# Patient Record
Sex: FEMALE | Race: WHITE | URBAN - METROPOLITAN AREA
[De-identification: names, ages, dates, MRNs, and addresses within clinical notes are randomized per-mention and may not be internally consistent; named-entity substitution may affect disease eponyms.]

---

## 2020-09-15 ENCOUNTER — INPATIENT (INPATIENT)
Facility: HOSPITAL | Age: 80
LOS: 5 days | Discharge: HOME HEALTH | End: 2020-09-21
Attending: INTERNAL MEDICINE | Admitting: INTERNAL MEDICINE
Payer: MEDICARE

## 2020-09-15 VITALS
DIASTOLIC BLOOD PRESSURE: 76 MMHG | TEMPERATURE: 98 F | HEART RATE: 78 BPM | RESPIRATION RATE: 18 BRPM | OXYGEN SATURATION: 98 % | SYSTOLIC BLOOD PRESSURE: 143 MMHG

## 2020-09-15 LAB
ALBUMIN SERPL ELPH-MCNC: 3.6 G/DL — SIGNIFICANT CHANGE UP (ref 3.5–5.2)
ALP SERPL-CCNC: 64 U/L — SIGNIFICANT CHANGE UP (ref 30–115)
ALT FLD-CCNC: 18 U/L — SIGNIFICANT CHANGE UP (ref 0–41)
ANION GAP SERPL CALC-SCNC: 10 MMOL/L — SIGNIFICANT CHANGE UP (ref 7–14)
APTT BLD: 27.5 SEC — SIGNIFICANT CHANGE UP (ref 27–39.2)
AST SERPL-CCNC: 24 U/L — SIGNIFICANT CHANGE UP (ref 0–41)
BASOPHILS # BLD AUTO: 0.06 K/UL — SIGNIFICANT CHANGE UP (ref 0–0.2)
BASOPHILS NFR BLD AUTO: 0.5 % — SIGNIFICANT CHANGE UP (ref 0–1)
BILIRUB SERPL-MCNC: 0.3 MG/DL — SIGNIFICANT CHANGE UP (ref 0.2–1.2)
BUN SERPL-MCNC: 17 MG/DL — SIGNIFICANT CHANGE UP (ref 10–20)
CALCIUM SERPL-MCNC: 8.9 MG/DL — SIGNIFICANT CHANGE UP (ref 8.5–10.1)
CHLORIDE SERPL-SCNC: 103 MMOL/L — SIGNIFICANT CHANGE UP (ref 98–110)
CO2 SERPL-SCNC: 24 MMOL/L — SIGNIFICANT CHANGE UP (ref 17–32)
CREAT SERPL-MCNC: 1.1 MG/DL — SIGNIFICANT CHANGE UP (ref 0.7–1.5)
EOSINOPHIL # BLD AUTO: 0.05 K/UL — SIGNIFICANT CHANGE UP (ref 0–0.7)
EOSINOPHIL NFR BLD AUTO: 0.4 % — SIGNIFICANT CHANGE UP (ref 0–8)
GLUCOSE SERPL-MCNC: 145 MG/DL — HIGH (ref 70–99)
HCT VFR BLD CALC: 29.8 % — LOW (ref 37–47)
HGB BLD-MCNC: 9.8 G/DL — LOW (ref 12–16)
IMM GRANULOCYTES NFR BLD AUTO: 0.4 % — HIGH (ref 0.1–0.3)
INR BLD: 0.99 RATIO — SIGNIFICANT CHANGE UP (ref 0.65–1.3)
LYMPHOCYTES # BLD AUTO: 17.6 % — LOW (ref 20.5–51.1)
LYMPHOCYTES # BLD AUTO: 2.11 K/UL — SIGNIFICANT CHANGE UP (ref 1.2–3.4)
MCHC RBC-ENTMCNC: 31.5 PG — HIGH (ref 27–31)
MCHC RBC-ENTMCNC: 32.9 G/DL — SIGNIFICANT CHANGE UP (ref 32–37)
MCV RBC AUTO: 95.8 FL — SIGNIFICANT CHANGE UP (ref 81–99)
MONOCYTES # BLD AUTO: 0.9 K/UL — HIGH (ref 0.1–0.6)
MONOCYTES NFR BLD AUTO: 7.5 % — SIGNIFICANT CHANGE UP (ref 1.7–9.3)
NEUTROPHILS # BLD AUTO: 8.79 K/UL — HIGH (ref 1.4–6.5)
NEUTROPHILS NFR BLD AUTO: 73.6 % — SIGNIFICANT CHANGE UP (ref 42.2–75.2)
NRBC # BLD: 0 /100 WBCS — SIGNIFICANT CHANGE UP (ref 0–0)
PLATELET # BLD AUTO: 208 K/UL — SIGNIFICANT CHANGE UP (ref 130–400)
POTASSIUM SERPL-MCNC: 4.1 MMOL/L — SIGNIFICANT CHANGE UP (ref 3.5–5)
POTASSIUM SERPL-SCNC: 4.1 MMOL/L — SIGNIFICANT CHANGE UP (ref 3.5–5)
PROT SERPL-MCNC: 5.8 G/DL — LOW (ref 6–8)
PROTHROM AB SERPL-ACNC: 11.4 SEC — SIGNIFICANT CHANGE UP (ref 9.95–12.87)
RBC # BLD: 3.11 M/UL — LOW (ref 4.2–5.4)
RBC # FLD: 13.2 % — SIGNIFICANT CHANGE UP (ref 11.5–14.5)
SODIUM SERPL-SCNC: 137 MMOL/L — SIGNIFICANT CHANGE UP (ref 135–146)
WBC # BLD: 11.96 K/UL — HIGH (ref 4.8–10.8)
WBC # FLD AUTO: 11.96 K/UL — HIGH (ref 4.8–10.8)

## 2020-09-15 PROCEDURE — 99285 EMERGENCY DEPT VISIT HI MDM: CPT

## 2020-09-15 PROCEDURE — 72125 CT NECK SPINE W/O DYE: CPT | Mod: 26

## 2020-09-15 PROCEDURE — 73502 X-RAY EXAM HIP UNI 2-3 VIEWS: CPT | Mod: 26,LT

## 2020-09-15 PROCEDURE — 72170 X-RAY EXAM OF PELVIS: CPT | Mod: 26,59

## 2020-09-15 PROCEDURE — 70450 CT HEAD/BRAIN W/O DYE: CPT | Mod: 26

## 2020-09-15 PROCEDURE — 93010 ELECTROCARDIOGRAM REPORT: CPT

## 2020-09-15 PROCEDURE — 71045 X-RAY EXAM CHEST 1 VIEW: CPT | Mod: 26

## 2020-09-15 RX ORDER — MORPHINE SULFATE 50 MG/1
4 CAPSULE, EXTENDED RELEASE ORAL ONCE
Refills: 0 | Status: DISCONTINUED | OUTPATIENT
Start: 2020-09-15 | End: 2020-09-15

## 2020-09-15 RX ORDER — METHOCARBAMOL 500 MG/1
1000 TABLET, FILM COATED ORAL ONCE
Refills: 0 | Status: COMPLETED | OUTPATIENT
Start: 2020-09-15 | End: 2020-09-15

## 2020-09-15 RX ADMIN — MORPHINE SULFATE 4 MILLIGRAM(S): 50 CAPSULE, EXTENDED RELEASE ORAL at 21:50

## 2020-09-15 RX ADMIN — MORPHINE SULFATE 4 MILLIGRAM(S): 50 CAPSULE, EXTENDED RELEASE ORAL at 22:20

## 2020-09-15 RX ADMIN — METHOCARBAMOL 1000 MILLIGRAM(S): 500 TABLET, FILM COATED ORAL at 23:49
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
## 2020-09-15 NOTE — ED ADULT NURSE NOTE - OBJECTIVE STATEMENT
patient complaining of left hip pain s/p fall at home. patient has sudden nerve pain on right hip that caused patient to fall on her left side. patient has numbness and tingling on her left leg but sensation intact. patient hit her head when she fell, denies LOC. no open wound on her head.
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
## 2020-09-15 NOTE — ED ADULT NURSE NOTE - NS ED PATIENT SAFETY CONCERN
Telephone Encounter by Elinor Aguilera at 10/11/18 03:11 PM     Author:  Elinor Aguilera Service:  (none) Author Type:  Patient      Filed:  10/11/18 03:12 PM Encounter Date:  10/11/2018 Status:  Signed     :  Elinor Aguilera (Patient )            Per orders from visit today, please place referral for patient to be seen for a nephrology consult[MM1.1M]       Revision History        User Key Date/Time User Provider Type Action    > MM1.1 10/11/18 03:12 PM Elinor Aguilera Patient  Sign    M - Manual             No

## 2020-09-15 NOTE — ED PROVIDER NOTE - CLINICAL SUMMARY MEDICAL DECISION MAKING FREE TEXT BOX
L hip pain s/p mechanical fall - pxr read as L hip intertroch bony island / no fx, remainder of imaging neg for acute injuries, sx described c/w poss sciatica?, low suspicion for occult fx, pt reassessed after analgasia and unable to ambulate in ED, admitted for inpt PT/Rehab consult & further mgmt

## 2020-09-15 NOTE — ED PROVIDER NOTE - PHYSICAL EXAMINATION
nad  skin warm, dry  ncat  neck supple  rrr nl s1s2 no mrg  ctab no wrr  abd soft ntnd no palpable masses no rgr  pelvis- stable, +ttp lat aspect of hip, no ecchymosis or deformity, rom intact but painful, nvid  back non-tender no cvat  ext see pelvis exam; no cce dpi  neuro aaox3 grossly nf exam

## 2020-09-16 LAB — SARS-COV-2 RNA SPEC QL NAA+PROBE: SIGNIFICANT CHANGE UP

## 2020-09-16 PROCEDURE — 99221 1ST HOSP IP/OBS SF/LOW 40: CPT | Mod: AI,GC

## 2020-09-16 PROCEDURE — 72149 MRI LUMBAR SPINE W/DYE: CPT | Mod: 26

## 2020-09-16 RX ORDER — SENNA PLUS 8.6 MG/1
2 TABLET ORAL ONCE
Refills: 0 | Status: DISCONTINUED | OUTPATIENT
Start: 2020-09-16 | End: 2020-09-21

## 2020-09-16 RX ORDER — TAMOXIFEN CITRATE 20 MG/1
0 TABLET, FILM COATED ORAL
Qty: 0 | Refills: 3 | DISCHARGE

## 2020-09-16 RX ORDER — TAMOXIFEN CITRATE 20 MG/1
20 TABLET, FILM COATED ORAL AT BEDTIME
Refills: 0 | Status: DISCONTINUED | OUTPATIENT
Start: 2020-09-16 | End: 2020-09-21

## 2020-09-16 RX ORDER — OXYCODONE AND ACETAMINOPHEN 5; 325 MG/1; MG/1
1 TABLET ORAL EVERY 6 HOURS
Refills: 0 | Status: ACTIVE | OUTPATIENT
Start: 2020-09-16 | End: 2020-09-23

## 2020-09-16 RX ORDER — ENOXAPARIN SODIUM 100 MG/ML
40 INJECTION SUBCUTANEOUS DAILY
Refills: 0 | Status: DISCONTINUED | OUTPATIENT
Start: 2020-09-16 | End: 2020-09-21

## 2020-09-16 RX ORDER — SENNA PLUS 8.6 MG/1
2 TABLET ORAL AT BEDTIME
Refills: 0 | Status: DISCONTINUED | OUTPATIENT
Start: 2020-09-17 | End: 2020-09-21

## 2020-09-16 RX ORDER — METHOCARBAMOL 500 MG/1
500 TABLET, FILM COATED ORAL THREE TIMES A DAY
Refills: 0 | Status: COMPLETED | OUTPATIENT
Start: 2020-09-16 | End: 2020-09-18

## 2020-09-16 RX ORDER — PANTOPRAZOLE SODIUM 20 MG/1
40 TABLET, DELAYED RELEASE ORAL
Refills: 0 | Status: DISCONTINUED | OUTPATIENT
Start: 2020-09-16 | End: 2020-09-21

## 2020-09-16 RX ORDER — ONDANSETRON 8 MG/1
4 TABLET, FILM COATED ORAL EVERY 6 HOURS
Refills: 0 | Status: DISCONTINUED | OUTPATIENT
Start: 2020-09-16 | End: 2020-09-21

## 2020-09-16 RX ADMIN — OXYCODONE AND ACETAMINOPHEN 1 TABLET(S): 5; 325 TABLET ORAL at 03:33

## 2020-09-16 RX ADMIN — METHOCARBAMOL 500 MILLIGRAM(S): 500 TABLET, FILM COATED ORAL at 05:50

## 2020-09-16 RX ADMIN — OXYCODONE AND ACETAMINOPHEN 1 TABLET(S): 5; 325 TABLET ORAL at 10:10

## 2020-09-16 RX ADMIN — OXYCODONE AND ACETAMINOPHEN 1 TABLET(S): 5; 325 TABLET ORAL at 09:41

## 2020-09-16 RX ADMIN — METHOCARBAMOL 500 MILLIGRAM(S): 500 TABLET, FILM COATED ORAL at 21:45

## 2020-09-16 RX ADMIN — ENOXAPARIN SODIUM 40 MILLIGRAM(S): 100 INJECTION SUBCUTANEOUS at 12:08

## 2020-09-16 RX ADMIN — ONDANSETRON 4 MILLIGRAM(S): 8 TABLET, FILM COATED ORAL at 09:41

## 2020-09-16 RX ADMIN — METHOCARBAMOL 500 MILLIGRAM(S): 500 TABLET, FILM COATED ORAL at 13:04

## 2020-09-16 RX ADMIN — PANTOPRAZOLE SODIUM 40 MILLIGRAM(S): 20 TABLET, DELAYED RELEASE ORAL at 06:20

## 2020-09-16 RX ADMIN — TAMOXIFEN CITRATE 20 MILLIGRAM(S): 20 TABLET, FILM COATED ORAL at 21:45

## 2020-09-16 RX ADMIN — OXYCODONE AND ACETAMINOPHEN 1 TABLET(S): 5; 325 TABLET ORAL at 04:03

## 2020-09-16 NOTE — PHYSICAL THERAPY INITIAL EVALUATION ADULT - GENERAL OBSERVATIONS, REHAB EVAL
Spoke with SARAH Ceja prior to entering room; as per RN, pt unlikely to participate in PT session at this time as she was c/o excruciating pain earlier despite pain medication. Pt also pending Lumbar MRI (No fx found on x ray). Spoke with pt who is adamantly declining b/s PT due to 10/10 L gluteal/proximal LLE pain and LBP. PT answered all pt questions regarding PT POC and content for when pt appropriate. Will await MRI results before beginning OOB activity with PT as pain hopefully comes down; pt verbalized full agreement. Spoke with MD who is okay with application of hot packs to L gluteal site (as per pt request). Pt educated to remove packs after 20 min or if it feels too hot, RN notified of hot packs. Pt verbalized understanding. Spoke with SARAH Ceja prior to entering room; as per RN, pt unlikely to participate in PT session at this time as she was c/o excruciating pain earlier despite pain medication. Pt also pending Lumbar MRI (No fx found on x ray). Spoke with pt who is adamantly declining b/s PT due to 10/10 L gluteal/proximal LLE pain and LBP. PT answered all pt questions regarding PT POC and content for when pt appropriate. Will await MRI results before beginning OOB activity with PT as pain hopefully comes down; pt verbalized full agreement. Spoke with MD ([id number]) who is okay with application of hot packs to L gluteal site (as per pt request). Pt educated to remove packs after 20 min or if it feels too hot, RN notified of hot packs. Pt verbalized understanding.
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
## 2020-09-16 NOTE — H&P ADULT - NSHPPHYSICALEXAM_GEN_ALL_CORE
CONSTITUTIONAL: No acute distress, well-developed, well-groomed, AAOx3  HEAD: Atraumatic, normocephalic  EYES: EOM intact, PERRLA, conjunctiva and sclera clear  ENT: Supple, no masses, no thyromegaly, no bruits, no JVD; moist mucous membranes  PULMONARY: Clear to auscultation bilaterally; no wheezes, rales, or rhonchi  CARDIOVASCULAR: Regular rate and rhythm; no murmurs, rubs, or gallops  GASTROINTESTINAL: Soft, non-tender, non-distended; bowel sounds present  MUSCULOSKELETAL: 2+ peripheral pulses;   NEUROLOGY: non-focal  SKIN: No rashes or lesions; warm and dry CONSTITUTIONAL: In acute distress due to pain   HEAD: Atraumatic, normocephalic  EYES: EOM intact, PERRLA, conjunctiva and sclera clear  ENT: Supple, no masses, no thyromegaly, no bruits, no JVD; moist mucous membranes  PULMONARY: Clear to auscultation bilaterally; no wheezes, rales, or rhonchi  CARDIOVASCULAR: Regular rate and rhythm; no murmurs, rubs, or gallops  GASTROINTESTINAL: Soft, non-tender, non-distended; bowel sounds present  MUSCULOSKELETAL: 2+ peripheral pulses. pain with movement of LLE  NEUROLOGY: non-focal  SKIN: No rashes or lesions; warm and dry

## 2020-09-16 NOTE — H&P ADULT - NSHPREVIEWOFSYSTEMS_GEN_ALL_CORE
CONSTITUTIONAL: No weakness, fevers or chills; No headaches  EYES: No visual changes, eye pain, or discharge  ENT: No vertigo; No ear pain or change in hearing; No sore throat or difficulty swallowing  NECK: No pain or stiffness  RESPIRATORY: No cough, wheezing, or hemoptysis; No shortness of breath  CARDIOVASCULAR: No chest pain or palpitations  GASTROINTESTINAL: No abdominal or epigastric pain; No nausea, vomiting, or hematemesis; No diarrhea or constipation; No melena or hematochezia  GENITOURINARY: No dysuria, frequency or hematuria  MUSCULOSKELETAL: No joint pain, no muscle pain, no weakness  NEUROLOGICAL: No numbness or weakness  SKIN: No itching or rashes CONSTITUTIONAL: No weakness, fevers or chills; No headaches  EYES: No visual changes, eye pain, or discharge  ENT: No vertigo; No ear pain or change in hearing; No sore throat or difficulty swallowing  NECK: No pain or stiffness  RESPIRATORY: No cough, wheezing, or hemoptysis; No shortness of breath  CARDIOVASCULAR: No chest pain or palpitations  GASTROINTESTINAL: No abdominal or epigastric pain; No nausea, vomiting, or hematemesis; No diarrhea or constipation; No melena or hematochezia  GENITOURINARY: No dysuria, frequency or hematuria  MUSCULOSKELETAL: Muscle and joint pain   NEUROLOGICAL: No numbness or weakness  SKIN: No itching or rashes

## 2020-09-16 NOTE — H&P ADULT - NSHPLABSRESULTS_GEN_ALL_CORE
VITAL SIGNS: Last 24 Hours  T(C): 36.7 (15 Sep 2020 23:20), Max: 36.8 (15 Sep 2020 20:50)  T(F): 98 (15 Sep 2020 23:20), Max: 98.2 (15 Sep 2020 20:50)  HR: 73 (15 Sep 2020 23:20) (73 - 78)  BP: 126/59 (15 Sep 2020 23:20) (126/59 - 143/76)  BP(mean): --  RR: 18 (15 Sep 2020 23:20) (18 - 18)  SpO2: 98% (15 Sep 2020 23:20) (98% - 98%)    LABS:                        9.8    11.96 )-----------( 208      ( 15 Sep 2020 21:40 )             29.8     09-15    137  |  103  |  17  ----------------------------<  145<H>  4.1   |  24  |  1.1    Ca    8.9      15 Sep 2020 21:40    TPro  5.8<L>  /  Alb  3.6  /  TBili  0.3  /  DBili  x   /  AST  24  /  ALT  18  /  AlkPhos  64  09-15    PT/INR - ( 15 Sep 2020 21:40 )   PT: 11.40 sec;   INR: 0.99 ratio         PTT - ( 15 Sep 2020 21:40 )  PTT:27.5 sec      RADIOLOGY:    < from: CT Head No Cont (09.15.20 @ 22:20) >  Impression:  No acute intracranial hemorrhage, mass-effect or midline shift.  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  < end of copied text >    < from: Xray Hip 2-3 Views, Left (09.15.20 @ 21:52) >  There are mild degenerative changesof the left hip. No definite fracture is seen. Apparent bone island is projecting over the left intertrochanteric region and measures approximately 7 mm.  < end of copied text >    < from: Xray Pelvis AP only (09.15.20 @ 21:51) >  There are mild degenerative changes of both hips. No definite fracture is seen.  < end of copied text > VITAL SIGNS: Last 24 Hours  T(C): 36.7 (15 Sep 2020 23:20), Max: 36.8 (15 Sep 2020 20:50)  T(F): 98 (15 Sep 2020 23:20), Max: 98.2 (15 Sep 2020 20:50)  HR: 73 (15 Sep 2020 23:20) (73 - 78)  BP: 126/59 (15 Sep 2020 23:20) (126/59 - 143/76)  BP(mean): --  RR: 18 (15 Sep 2020 23:20) (18 - 18)  SpO2: 98% (15 Sep 2020 23:20) (98% - 98%)    LABS:                        9.8    11.96 )-----------( 208      ( 15 Sep 2020 21:40 )             29.8     09-15    137  |  103  |  17  ----------------------------<  145<H>  4.1   |  24  |  1.1    Ca    8.9      15 Sep 2020 21:40    TPro  5.8<L>  /  Alb  3.6  /  TBili  0.3  /  DBili  x   /  AST  24  /  ALT  18  /  AlkPhos  64  09-15    PT/INR - ( 15 Sep 2020 21:40 )   PT: 11.40 sec;   INR: 0.99 ratio         PTT - ( 15 Sep 2020 21:40 )  PTT:27.5 sec      RADIOLOGY:    < from: CT Head No Cont (09.15.20 @ 22:20) >  Impression:  No acute intracranial hemorrhage, mass-effect or midline shift.  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  < end of copied text >    < from: Xray Hip 2-3 Views, Left (09.15.20 @ 21:52) >  There are mild degenerative changes of the left hip. No definite fracture is seen. Apparent bone island is projecting over the left intertrochanteric region and measures approximately 7 mm.  < end of copied text >    < from: Xray Pelvis AP only (09.15.20 @ 21:51) >  There are mild degenerative changes of both hips. No definite fracture is seen.  < end of copied text >

## 2020-09-17 LAB
ABO RH CONFIRMATION: SIGNIFICANT CHANGE UP
BASOPHILS # BLD AUTO: 0.05 K/UL — SIGNIFICANT CHANGE UP (ref 0–0.2)
BASOPHILS NFR BLD AUTO: 0.4 % — SIGNIFICANT CHANGE UP (ref 0–1)
CK SERPL-CCNC: 72 U/L — SIGNIFICANT CHANGE UP (ref 0–225)
EOSINOPHIL # BLD AUTO: 0.06 K/UL — SIGNIFICANT CHANGE UP (ref 0–0.7)
EOSINOPHIL NFR BLD AUTO: 0.5 % — SIGNIFICANT CHANGE UP (ref 0–8)
GLUCOSE BLDC GLUCOMTR-MCNC: 137 MG/DL — HIGH (ref 70–99)
GLUCOSE BLDC GLUCOMTR-MCNC: 190 MG/DL — HIGH (ref 70–99)
HCT VFR BLD CALC: 21.4 % — LOW (ref 37–47)
HCT VFR BLD CALC: 27.5 % — LOW (ref 37–47)
HGB BLD-MCNC: 7.1 G/DL — LOW (ref 12–16)
HGB BLD-MCNC: 9.2 G/DL — LOW (ref 12–16)
IMM GRANULOCYTES NFR BLD AUTO: 0.6 % — HIGH (ref 0.1–0.3)
IRON SATN MFR SERPL: 19 % — SIGNIFICANT CHANGE UP (ref 15–50)
IRON SATN MFR SERPL: 37 UG/DL — SIGNIFICANT CHANGE UP (ref 35–150)
LYMPHOCYTES # BLD AUTO: 2.53 K/UL — SIGNIFICANT CHANGE UP (ref 1.2–3.4)
LYMPHOCYTES # BLD AUTO: 20.9 % — SIGNIFICANT CHANGE UP (ref 20.5–51.1)
MCHC RBC-ENTMCNC: 30.5 PG — SIGNIFICANT CHANGE UP (ref 27–31)
MCHC RBC-ENTMCNC: 31.1 PG — HIGH (ref 27–31)
MCHC RBC-ENTMCNC: 33.2 G/DL — SIGNIFICANT CHANGE UP (ref 32–37)
MCHC RBC-ENTMCNC: 33.5 G/DL — SIGNIFICANT CHANGE UP (ref 32–37)
MCV RBC AUTO: 91.1 FL — SIGNIFICANT CHANGE UP (ref 81–99)
MCV RBC AUTO: 93.9 FL — SIGNIFICANT CHANGE UP (ref 81–99)
MONOCYTES # BLD AUTO: 1.43 K/UL — HIGH (ref 0.1–0.6)
MONOCYTES NFR BLD AUTO: 11.8 % — HIGH (ref 1.7–9.3)
NEUTROPHILS # BLD AUTO: 7.97 K/UL — HIGH (ref 1.4–6.5)
NEUTROPHILS NFR BLD AUTO: 65.8 % — SIGNIFICANT CHANGE UP (ref 42.2–75.2)
NRBC # BLD: 0 /100 WBCS — SIGNIFICANT CHANGE UP (ref 0–0)
NRBC # BLD: 0 /100 WBCS — SIGNIFICANT CHANGE UP (ref 0–0)
PLATELET # BLD AUTO: 147 K/UL — SIGNIFICANT CHANGE UP (ref 130–400)
PLATELET # BLD AUTO: 161 K/UL — SIGNIFICANT CHANGE UP (ref 130–400)
RBC # BLD: 2.28 M/UL — LOW (ref 4.2–5.4)
RBC # BLD: 3.02 M/UL — LOW (ref 4.2–5.4)
RBC # FLD: 13.3 % — SIGNIFICANT CHANGE UP (ref 11.5–14.5)
RBC # FLD: 14.4 % — SIGNIFICANT CHANGE UP (ref 11.5–14.5)
TIBC SERPL-MCNC: 192 UG/DL — LOW (ref 220–430)
TRANSFERRIN SERPL-MCNC: 164 MG/DL — LOW (ref 200–360)
UIBC SERPL-MCNC: 155 UG/DL — SIGNIFICANT CHANGE UP (ref 110–370)
WBC # BLD: 11.29 K/UL — HIGH (ref 4.8–10.8)
WBC # BLD: 12.11 K/UL — HIGH (ref 4.8–10.8)
WBC # FLD AUTO: 11.29 K/UL — HIGH (ref 4.8–10.8)
WBC # FLD AUTO: 12.11 K/UL — HIGH (ref 4.8–10.8)

## 2020-09-17 PROCEDURE — 99233 SBSQ HOSP IP/OBS HIGH 50: CPT

## 2020-09-17 RX ORDER — TRAMADOL HYDROCHLORIDE 50 MG/1
50 TABLET ORAL ONCE
Refills: 0 | Status: DISCONTINUED | OUTPATIENT
Start: 2020-09-17 | End: 2020-09-17

## 2020-09-17 RX ORDER — ACETAMINOPHEN 500 MG
650 TABLET ORAL EVERY 6 HOURS
Refills: 0 | Status: DISCONTINUED | OUTPATIENT
Start: 2020-09-17 | End: 2020-09-21

## 2020-09-17 RX ORDER — DEXAMETHASONE 0.5 MG/5ML
4 ELIXIR ORAL EVERY 8 HOURS
Refills: 0 | Status: DISCONTINUED | OUTPATIENT
Start: 2020-09-17 | End: 2020-09-18

## 2020-09-17 RX ORDER — GABAPENTIN 400 MG/1
100 CAPSULE ORAL AT BEDTIME
Refills: 0 | Status: DISCONTINUED | OUTPATIENT
Start: 2020-09-17 | End: 2020-09-18

## 2020-09-17 RX ORDER — CHLORHEXIDINE GLUCONATE 213 G/1000ML
1 SOLUTION TOPICAL
Refills: 0 | Status: DISCONTINUED | OUTPATIENT
Start: 2020-09-17 | End: 2020-09-21

## 2020-09-17 RX ADMIN — PANTOPRAZOLE SODIUM 40 MILLIGRAM(S): 20 TABLET, DELAYED RELEASE ORAL at 05:38

## 2020-09-17 RX ADMIN — TRAMADOL HYDROCHLORIDE 50 MILLIGRAM(S): 50 TABLET ORAL at 01:30

## 2020-09-17 RX ADMIN — METHOCARBAMOL 500 MILLIGRAM(S): 500 TABLET, FILM COATED ORAL at 21:44

## 2020-09-17 RX ADMIN — Medication 4 MILLIGRAM(S): at 21:42

## 2020-09-17 RX ADMIN — TAMOXIFEN CITRATE 20 MILLIGRAM(S): 20 TABLET, FILM COATED ORAL at 21:43

## 2020-09-17 RX ADMIN — CHLORHEXIDINE GLUCONATE 1 APPLICATION(S): 213 SOLUTION TOPICAL at 21:39

## 2020-09-17 RX ADMIN — GABAPENTIN 100 MILLIGRAM(S): 400 CAPSULE ORAL at 21:43

## 2020-09-17 RX ADMIN — Medication 4 MILLIGRAM(S): at 16:14

## 2020-09-17 RX ADMIN — METHOCARBAMOL 500 MILLIGRAM(S): 500 TABLET, FILM COATED ORAL at 13:23

## 2020-09-17 RX ADMIN — ENOXAPARIN SODIUM 40 MILLIGRAM(S): 100 INJECTION SUBCUTANEOUS at 12:01

## 2020-09-17 RX ADMIN — SENNA PLUS 2 TABLET(S): 8.6 TABLET ORAL at 21:43

## 2020-09-17 RX ADMIN — TRAMADOL HYDROCHLORIDE 50 MILLIGRAM(S): 50 TABLET ORAL at 00:58

## 2020-09-17 RX ADMIN — METHOCARBAMOL 500 MILLIGRAM(S): 500 TABLET, FILM COATED ORAL at 05:38

## 2020-09-17 NOTE — PHYSICAL THERAPY INITIAL EVALUATION ADULT - SPECIFY REASON(S)
915 am Will hold PT at this time due to decreased Hgb level (6.8). PT to follow up.
Statement Selected

## 2020-09-17 NOTE — PHYSICAL THERAPY INITIAL EVALUATION ADULT - PASSIVE RANGE OF MOTION EXAMINATION, REHAB EVAL
deficits as listed below/LLE limited due to 10/10 radiating pain from left hip and L/S(hip flexion ~90 degrees in supine)

## 2020-09-17 NOTE — PHYSICAL THERAPY INITIAL EVALUATION ADULT - LEVEL OF INDEPENDENCE: GAIT, REHAB EVAL
n/a d/t pt becoming dizzy while attempting static standing at bedside. Pt was immediately put back in bed and BP taken (115/56 mmHg, HR = 82bpm)

## 2020-09-17 NOTE — PROGRESS NOTE ADULT - ASSESSMENT
# s/p mechanical fall at home with inability to ambulate  - pelvic and hips x ray - no acute fractures  - s/p MR L-Spine:  from: MR Lumbar Spine w/ IV Cont (09.16.20 @ 22:07) >    Multilevel mild to moderate lumbar spondylosis, most significant at L3-L4 with moderate spinal canal stenosis and mild bilateral foraminal narrowing.    No abnormal enhancement.  - start on Neurontin 100 mg po once daily at bedtime  -start on Decadron trial tx. 4mg po every 8 hours - to help decrease local inflammatory process post fall   -Tylenol/tramadol prn for pain management, pt. is not tolerating percocet( vomiting post intake)  -Robaxin tx.  -PT eval to assess ambulation  -Neurosurgical eval    # Macrocytic anemia- obtain anemia work up  - no gross bleeding events  - f/up stool hemoccult study  -transfuse 1 unit PRBC today  -next CBC 8pm tonight    # h/o breast ca- on tamoxifen tx.    # Constipated- on laxative tx.    daily DVT prophylaxis    #Progress Note Handoff: optimize pain control, PT eval, Neurosurgical eval    Family discussion: medical plan of tx. was d/w pt. by bedside   Disposition: Home__once medically stable
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
## 2020-09-17 NOTE — PHYSICAL THERAPY INITIAL EVALUATION ADULT - MANUAL MUSCLE TESTING RESULTS, REHAB EVAL
RLE 3+/5; LLE not tested due to radiating 10/10 pain in LLE from left hip and L/S/grossly assessed due to
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
## 2020-09-17 NOTE — PROGRESS NOTE ADULT - SUBJECTIVE AND OBJECTIVE BOX
TONI MORRIS  [facility] T[facility] 0[address] ([facility])            Patient was evaluated and examined  by bedside, c/o severe legs pain and hips pain post fall, unable to ambulate due to legs pain, patient was crying during physical exam.                REVIEW OF SYSTEMS:  please see pertinent positives mentioned above, all other 12 ROS negative      T(C): , Max: 37.1 (09-17-20 @ 05:37)  HR: 86 (09-17-20 @ 13:02)  BP: 97/52 (09-17-20 @ 13:02)  RR: 18 (09-17-20 @ 13:02)  SpO2: --  CAPILLARY BLOOD GLUCOSE          PHYSICAL EXAM:  General: NAD, AAOX3, patient is laying comfortably in bed  HEENT: AT, NC, Supple, NO JVD, NO CB  Lungs: CTA B/L, no wheezing, no rhonchi  CVS: normal S1, S2, RRR, NO M/G/R  Abdomen: soft, bowel sounds present, non-tender, non-distended  Extremities: no edema, no clubbing, no cyanosis, positive peripheral pulses b/l  Neuro: no acute focal neurological deficits, hyperesthesia of b/l lower extremities, gait not  tested  Skin: no rash, no ecchymosis      LAB  CBC  Date: 09-17-20 @ 11:47  Mean cell Fpomlyxixr70.3  Mean cell Hemoglobin Conc33.0  Mean cell Volum 94.8  Platelet count-Automate 183  RBC Count 2.33  Red Cell Distrib Width13.3  WBC Count12.35  % Albumin, Urine--  Hematocrit 22.1  Hemoglobin 7.3  CBC  Date: 09-17-20 @ 10:50  Mean cell Kkigunvwwb43.1  Mean cell Hemoglobin Conc33.2  Mean cell Volum 93.9  Platelet count-Automate 161  RBC Count 2.28  Red Cell Distrib Width13.3  WBC Count12.11  % Albumin, Urine--  Hematocrit 21.4  Hemoglobin 7.1  CBC  Date: 09-17-20 @ 06:10  Mean cell Hrtsrsvdxe24.6  Mean cell Hemoglobin Conc33.0  Mean cell Volum 95.8  Platelet count-Automate 150  RBC Count 2.15  Red Cell Distrib Width13.3  WBC Count10.60  % Albumin, Urine--  Hematocrit 20.6  Hemoglobin 6.8  CBC  Date: 09-15-20 @ 21:40  Mean cell Zjtnmmyhhz47.5  Mean cell Hemoglobin Conc32.9  Mean cell Volum 95.8  Platelet count-Automate 208  RBC Count 3.11  Red Cell Distrib Width13.2  WBC Count11.96  % Albumin, Urine--  Hematocrit 29.8  Hemoglobin 9.8    [facility]  09-17-20 @ 06:10  Blood Gas Arterial-Calcium,Ionized--  Blood Urea Nitrogen, Serum 9 mg/dL<L> [10 - 20]  Carbon Dioxide, Serum24 mmol/L [17 - 32]  Chloride, Xygng430 mmol/L [98 - 110]  Creatinie, Serum1.0 mg/dL [0.7 - 1.5]  Glucose, Ectzf154 mg/dL<H> [70 - 99]  Potassium, Serum4.1 mmol/L [3.5 - 5.0]  Sodium, Serum 137 mmol/L [135 - 146]  [facility]  09-15-20 @ 21:40  Blood Gas Arterial-Calcium,Ionized--  Blood Urea Nitrogen, Serum 17 mg/dL [10 - 20]  Carbon Dioxide, Serum24 mmol/L [17 - 32]  Chloride, Xnkvb937 mmol/L [98 - 110]  Creatinie, Serum1.1 mg/dL [0.7 - 1.5]  Glucose, Tbqvk408 mg/dL<H> [70 - 99]  Potassium, Serum4.1 mmol/L [3.5 - 5.0]  Sodium, Serum 137 mmol/L [135 - 146]        PT/INR - ( 15 Sep 2020 21:40 )   PT: 11.40 sec;   INR: 0.99 ratio         PTT - ( 15 Sep 2020 21:40 )  PTT:27.5 sec        Medications:  acetaminophen   Tablet .. 650 milliGRAM(s) Oral every 6 hours PRN  chlorhexidine 4% Liquid 1 Application(s) Topical <User Schedule>  dexAMETHasone     Tablet 4 milliGRAM(s) Oral every 8 hours  enoxaparin Injectable 40 milliGRAM(s) SubCutaneous daily  gabapentin 100 milliGRAM(s) Oral at bedtime  methocarbamol 500 milliGRAM(s) Oral three times a day  ondansetron Injectable 4 milliGRAM(s) IV Push every 6 hours PRN  pantoprazole    Tablet 40 milliGRAM(s) Oral before breakfast  senna 2 Tablet(s) Oral once PRN  senna 2 Tablet(s) Oral at bedtime  tamoxifen 20 milliGRAM(s) Oral at bedtime  traMADol 50 milliGRAM(s) Oral every 8 hours PRN        Assessment and Plan:  # s/p mechanical fall at home with inability to ambulate  - pelvic and hips x ray - no acute fractures  - s/p MR L-Spine:  from: MR Lumbar Spine w/ IV Cont (09.16.20 @ 22:07) >    Multilevel mild to moderate lumbar spondylosis, most significant at L3-L4 with moderate spinal canal stenosis and mild bilateral foraminal narrowing.    No abnormal enhancement.    - start on Neurontin 100 mg po once daily at bedtime  -start on Decadron trial tx. 4mg po every 8 hours - to held decrease local inflammatory process post fall   -Tylenol/tramadol prn for pain management, pt. is not tolerating percocet( vomiting post intake)  -Robaxin tx.  -PT eval to assess ambulation  -Neurosurgical eval    # Macrocytic anemia- obtain anemia work up  - no gross bleeding events  - f/up stool hemeoccult study  -transfuse 1 unit PRBC today  -next CBC 6 pm tonight    # h/o breast ca- on tamoxifen tx.    # Constipated- on laxative tx.    daily DVT proph.     #Progress Note Handoff: optimize pain control, PT eval, Neurosurgical eval  Family discussion: medical plan of tx. was d/w pt. by bedside Disposition: Home__once medically stable

## 2020-09-17 NOTE — PROGRESS NOTE ADULT - SUBJECTIVE AND OBJECTIVE BOX
TONI MORRIS 79y Female  MRN#: [id number]   CODE STATUS:________      SUBJECTIVE  Patient is a 79y old Female who presents with a chief complaint of Mechanical Fall (17 Sep 2020 16:37)  Currently admitted to medicine with the primary diagnosis of Ambulatory dysfunction      Today is hospital day 1d.  Since yesterday patient underwent MRI of the LS to evaluate the left leg pain complaint. Patient stated to me this morning that she is expressing burning pain which travels down her lower back, down the buttocks, across the thigh and down to her toes. She notes to me that she has a history of back pain and disc herniation. Patient was laying down in the bed very concerned and in discomfort.           OBJECTIVE  PAST MEDICAL & SURGICAL HISTORY  Breast cancer      ALLERGIES:  penicillin (Anaphylaxis)    MEDICATIONS:  STANDING MEDICATIONS  bisacodyl 5 milliGRAM(s) Oral at bedtime  chlorhexidine 4% Liquid 1 Application(s) Topical <User Schedule>  dexAMETHasone     Tablet 4 milliGRAM(s) Oral every 8 hours  enoxaparin Injectable 40 milliGRAM(s) SubCutaneous daily  gabapentin 100 milliGRAM(s) Oral at bedtime  methocarbamol 500 milliGRAM(s) Oral three times a day  pantoprazole    Tablet 40 milliGRAM(s) Oral before breakfast  senna 2 Tablet(s) Oral at bedtime  tamoxifen 20 milliGRAM(s) Oral at bedtime    PRN MEDICATIONS  acetaminophen   Tablet .. 650 milliGRAM(s) Oral every 6 hours PRN  ondansetron Injectable 4 milliGRAM(s) IV Push every 6 hours PRN  senna 2 Tablet(s) Oral once PRN  traMADol 50 milliGRAM(s) Oral every 8 hours PRN      VITAL SIGNS: Last 24 Hours  T(C): 37.1 (17 Sep 2020 13:02), Max: 37.1 (17 Sep 2020 05:37)  T(F): 98.7 (17 Sep 2020 13:02), Max: 98.8 (17 Sep 2020 05:37)  HR: 86 (17 Sep 2020 13:02) (86 - 88)  BP: 97/52 (17 Sep 2020 13:02) (97/52 - 109/58)  BP(mean): --  RR: 18 (17 Sep 2020 13:02) (18 - 18)  SpO2: --    LABS:                        7.3    12.35 )-----------( 183      ( 17 Sep 2020 11:47 )             22.1     09-17    137  |  105  |  9<L>  ----------------------------<  106<H>  4.1   |  24  |  1.0    Ca    8.2<L>      17 Sep 2020 06:10    TPro  4.9<L>  /  Alb  3.2<L>  /  TBili  0.4  /  DBili  x   /  AST  16  /  ALT  12  /  AlkPhos  37  09-17    PT/INR - ( 15 Sep 2020 21:40 )   PT: 11.40 sec;   INR: 0.99 ratio         PTT - ( 15 Sep 2020 21:40 )  PTT:27.5 sec      Creatine Kinase, Serum: 72 U/L (09-17-20 @ 11:47)      CARDIAC MARKERS ( 17 Sep 2020 11:47 )  x     / x     / 72 U/L / x     / x          RADIOLOGY:  A< from: MR Lumbar Spine w/ IV Cont (09.16.20 @ 22:07) >  At L1-L2, there is disc bulge with ligamentum flavum and facet hypertrophy resulting in mild spinal canal stenosis and mild bilateral foraminal narrowing.    At L2-L3, there is disc bulge with ligamentum flavum and facet hypertrophy resulting in mild spinal canal stenosis and moderate right foraminal narrowing. Left neural foramen is patent    At L3-L4, there is disc bulge with ligamentum flavum and facet hypertrophy resulting in moderate spinal canal stenosis and mild bilateral foraminal narrowing.    At L4-L5, there is disc bulge with ligamentum flavum thickening and bilateral facet hypertrophy resulting in mild spinal canal stenosis. There is moderate right neuroforaminal narrowing.    At L5-S1, there is no significant disc herniation, spinal canal or neural foraminal stenosis.      IMPRESSION:    Multilevel mild to moderate lumbar spondylosis, most significant at L3-L4 with moderate spinal canal stenosis and mild bilateral foraminal narrowing.    No abnormal enhancement.    < end of copied text >      PHYSICAL EXAM:    GENERAL: NAD, well-developed, AAOx3  HEENT:  Atraumatic, Normocephalic. EOMI, PERRLA, conjunctiva and sclera clear, No JVD  PULMONARY: Clear to auscultation bilaterally; No wheeze  CARDIOVASCULAR: Regular rate and rhythm; No murmurs, rubs, or gallops  GASTROINTESTINAL: Soft, Nontender, Nondistended; Bowel sounds present  MUSCULOSKELETAL:  2+ Peripheral Pulses, No clubbing, cyanosis, or edema  NEUROLOGY: non-focal, sensation intact. (Could not perform further exam, patient was bedridden with a very low Hgb)  SKIN: No rashes or lesions

## 2020-09-17 NOTE — PHYSICAL THERAPY INITIAL EVALUATION ADULT - WEIGHT-BEARING RESTRICTIONS: SIT/STAND, REHAB EVAL
full weight-bearing/pt was unable to stand on the first try d/t pain in LLE; after being instructed to place all weight through her right leg and UE pt was able to stand on second try for 1 minute before c/o dizziness
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
## 2020-09-18 ENCOUNTER — TRANSCRIPTION ENCOUNTER (OUTPATIENT)
Age: 80
End: 2020-09-18

## 2020-09-18 LAB
ALBUMIN SERPL ELPH-MCNC: 3.6 G/DL — SIGNIFICANT CHANGE UP (ref 3.5–5.2)
ALP SERPL-CCNC: 47 U/L — SIGNIFICANT CHANGE UP (ref 30–115)
ALT FLD-CCNC: 12 U/L — SIGNIFICANT CHANGE UP (ref 0–41)
ANION GAP SERPL CALC-SCNC: 9 MMOL/L — SIGNIFICANT CHANGE UP (ref 7–14)
AST SERPL-CCNC: 18 U/L — SIGNIFICANT CHANGE UP (ref 0–41)
BASOPHILS # BLD AUTO: 0.03 K/UL — SIGNIFICANT CHANGE UP (ref 0–0.2)
BASOPHILS NFR BLD AUTO: 0.2 % — SIGNIFICANT CHANGE UP (ref 0–1)
BILIRUB SERPL-MCNC: 0.7 MG/DL — SIGNIFICANT CHANGE UP (ref 0.2–1.2)
BUN SERPL-MCNC: 10 MG/DL — SIGNIFICANT CHANGE UP (ref 10–20)
CALCIUM SERPL-MCNC: 9 MG/DL — SIGNIFICANT CHANGE UP (ref 8.5–10.1)
CHLORIDE SERPL-SCNC: 105 MMOL/L — SIGNIFICANT CHANGE UP (ref 98–110)
CO2 SERPL-SCNC: 25 MMOL/L — SIGNIFICANT CHANGE UP (ref 17–32)
CREAT SERPL-MCNC: 0.9 MG/DL — SIGNIFICANT CHANGE UP (ref 0.7–1.5)
EOSINOPHIL # BLD AUTO: 0 K/UL — SIGNIFICANT CHANGE UP (ref 0–0.7)
EOSINOPHIL NFR BLD AUTO: 0 % — SIGNIFICANT CHANGE UP (ref 0–8)
FERRITIN SERPL-MCNC: 73 NG/ML — SIGNIFICANT CHANGE UP (ref 15–150)
FOLATE SERPL-MCNC: 15.5 NG/ML — SIGNIFICANT CHANGE UP
GLUCOSE SERPL-MCNC: 134 MG/DL — HIGH (ref 70–99)
HCT VFR BLD CALC: 27.7 % — LOW (ref 37–47)
HGB BLD-MCNC: 9.3 G/DL — LOW (ref 12–16)
IMM GRANULOCYTES NFR BLD AUTO: 1.3 % — HIGH (ref 0.1–0.3)
LYMPHOCYTES # BLD AUTO: 1.64 K/UL — SIGNIFICANT CHANGE UP (ref 1.2–3.4)
LYMPHOCYTES # BLD AUTO: 12.2 % — LOW (ref 20.5–51.1)
MCHC RBC-ENTMCNC: 30.6 PG — SIGNIFICANT CHANGE UP (ref 27–31)
MCHC RBC-ENTMCNC: 33.6 G/DL — SIGNIFICANT CHANGE UP (ref 32–37)
MCV RBC AUTO: 91.1 FL — SIGNIFICANT CHANGE UP (ref 81–99)
MONOCYTES # BLD AUTO: 1.04 K/UL — HIGH (ref 0.1–0.6)
MONOCYTES NFR BLD AUTO: 7.7 % — SIGNIFICANT CHANGE UP (ref 1.7–9.3)
NEUTROPHILS # BLD AUTO: 10.59 K/UL — HIGH (ref 1.4–6.5)
NEUTROPHILS NFR BLD AUTO: 78.6 % — HIGH (ref 42.2–75.2)
NRBC # BLD: 0 /100 WBCS — SIGNIFICANT CHANGE UP (ref 0–0)
PLATELET # BLD AUTO: 172 K/UL — SIGNIFICANT CHANGE UP (ref 130–400)
POTASSIUM SERPL-MCNC: 4.8 MMOL/L — SIGNIFICANT CHANGE UP (ref 3.5–5)
POTASSIUM SERPL-SCNC: 4.8 MMOL/L — SIGNIFICANT CHANGE UP (ref 3.5–5)
PROT SERPL-MCNC: 5.8 G/DL — LOW (ref 6–8)
RBC # BLD: 3.04 M/UL — LOW (ref 4.2–5.4)
RBC # FLD: 15.1 % — HIGH (ref 11.5–14.5)
SODIUM SERPL-SCNC: 139 MMOL/L — SIGNIFICANT CHANGE UP (ref 135–146)
VIT B12 SERPL-MCNC: 235 PG/ML — SIGNIFICANT CHANGE UP (ref 232–1245)
WBC # BLD: 13.48 K/UL — HIGH (ref 4.8–10.8)
WBC # FLD AUTO: 13.48 K/UL — HIGH (ref 4.8–10.8)

## 2020-09-18 PROCEDURE — 99233 SBSQ HOSP IP/OBS HIGH 50: CPT

## 2020-09-18 RX ORDER — GABAPENTIN 400 MG/1
300 CAPSULE ORAL
Refills: 0 | Status: DISCONTINUED | OUTPATIENT
Start: 2020-09-18 | End: 2020-09-18

## 2020-09-18 RX ORDER — DEXAMETHASONE 0.5 MG/5ML
4 ELIXIR ORAL EVERY 12 HOURS
Refills: 0 | Status: DISCONTINUED | OUTPATIENT
Start: 2020-09-18 | End: 2020-09-19

## 2020-09-18 RX ORDER — GABAPENTIN 400 MG/1
100 CAPSULE ORAL
Refills: 0 | Status: DISCONTINUED | OUTPATIENT
Start: 2020-09-18 | End: 2020-09-21

## 2020-09-18 RX ORDER — LIDOCAINE 4 G/100G
1 CREAM TOPICAL EVERY 24 HOURS
Refills: 0 | Status: DISCONTINUED | OUTPATIENT
Start: 2020-09-18 | End: 2020-09-18

## 2020-09-18 RX ADMIN — Medication 10 MILLIGRAM(S): at 13:09

## 2020-09-18 RX ADMIN — METHOCARBAMOL 500 MILLIGRAM(S): 500 TABLET, FILM COATED ORAL at 22:22

## 2020-09-18 RX ADMIN — GABAPENTIN 100 MILLIGRAM(S): 400 CAPSULE ORAL at 17:26

## 2020-09-18 RX ADMIN — SENNA PLUS 2 TABLET(S): 8.6 TABLET ORAL at 22:22

## 2020-09-18 RX ADMIN — TAMOXIFEN CITRATE 20 MILLIGRAM(S): 20 TABLET, FILM COATED ORAL at 22:22

## 2020-09-18 RX ADMIN — Medication 4 MILLIGRAM(S): at 05:47

## 2020-09-18 RX ADMIN — METHOCARBAMOL 500 MILLIGRAM(S): 500 TABLET, FILM COATED ORAL at 13:09

## 2020-09-18 RX ADMIN — ENOXAPARIN SODIUM 40 MILLIGRAM(S): 100 INJECTION SUBCUTANEOUS at 11:38

## 2020-09-18 RX ADMIN — PANTOPRAZOLE SODIUM 40 MILLIGRAM(S): 20 TABLET, DELAYED RELEASE ORAL at 05:48

## 2020-09-18 RX ADMIN — Medication 5 MILLIGRAM(S): at 22:22

## 2020-09-18 RX ADMIN — Medication 4 MILLIGRAM(S): at 17:26

## 2020-09-18 RX ADMIN — METHOCARBAMOL 500 MILLIGRAM(S): 500 TABLET, FILM COATED ORAL at 05:47

## 2020-09-18 RX ADMIN — CHLORHEXIDINE GLUCONATE 1 APPLICATION(S): 213 SOLUTION TOPICAL at 05:47

## 2020-09-18 NOTE — PROGRESS NOTE ADULT - ASSESSMENT
# s/p mechanical fall at home with inability to ambulate  - pelvic and hips x ray - no acute fractures  - s/p MR L-Spine:  from: MR Lumbar Spine w/ IV Cont (09.16.20 @ 22:07) >    Multilevel mild to moderate lumbar spondylosis, most significant at L3-L4 with moderate spinal canal stenosis and mild bilateral foraminal narrowing.    No abnormal enhancement.  - continue Neurontin 100 mg po once daily at bedtime  - adjust Decadron to 4mg po every 12 hours - to help decrease local inflammatory process post fall   - Tylenol/tramadol prn for pain management, pt. is not tolerating percocet( vomiting post intake)  - continue Robaxin tx.  - PT eval to assess ambulation  - Neurosurgical eval    # Macrocytic anemia- obtain anemia work up  - no gross bleeding events  - f/up stool hemoccult study  - transfuse 1 unit PRBC today  - f/u cbc    # h/o breast ca- on tamoxifen tx.    # Constipated- on laxative tx.    daily DVT prophylaxis    #Progress Note Handoff: optimize pain control, PT eval    Family discussion: medical plan of tx. was d/w pt. by bedside   Disposition: Home__once medically stable

## 2020-09-18 NOTE — PROGRESS NOTE ADULT - ATTENDING COMMENTS
I saw and evaluated patient  by bedside,  c/o lower back pain with radiation of pain to left leg , constipation , tolerating diet well.    All labs, radiology studies, VS was reviewed  I have reviewed the resident's note and agree with documented findings and  plan of care.  a/p:  # s/p mechanical fall at home with inability to ambulate, left sided sciatica  - pelvic and hips x ray - no acute fractures  - s/p MR L-Spine:  from: MR Lumbar Spine w/ IV Cont (09.16.20 @ 22:07) >    Multilevel mild to moderate lumbar spondylosis, most significant at L3-L4 with moderate spinal canal stenosis and mild bilateral foraminal narrowing.    No abnormal enhancement.    - start on Neurontin 100 mg po once twice daily  -taper down  Decadron trial tx. 4mg po every 12 hours   -Tylenol/tramadol prn for pain management, pt. is not tolerating percocet( vomiting post intake)  -Robaxin tx.  -PT eval to assess ambulation  -Neurosurgical eval- no interventions    # Macrocytic anemia-   - no gross bleeding events  - f/up stool hemeoccult study  -s/p 1 unit PRBC, h/h stable  -daily MVI tx.    # h/o breast ca- on tamoxifen tx.    # Constipated- on laxative tx.    daily DVT proph.     #Progress Note Handoff: PT tx. daily as tolerated, based on ambulatory status will start d/c planning  Family discussion: Medical plan of tx. was with pt. by bedside Disposition: Possibly Home___ in 24 to 48 hours

## 2020-09-18 NOTE — PROGRESS NOTE ADULT - SUBJECTIVE AND OBJECTIVE BOX
TONI MORRIS 79y Female  MRN#: [id number]   CODE STATUS:________      SUBJECTIVE  Patient is a 79y old Female who presents with a chief complaint of Mechanical Fall (17 Sep 2020 17:01)  Currently admitted to medicine with the primary diagnosis of Ambulatory dysfunction      Today is hospital day 2d.  No acute events overnight. Patient was given 1 unit PRBC and her hgb went up to 9.2. Patient continues to endorse left side back and lower extremity pain which is stated this morning was worse. She notes that it feels much better with medication. Otherwise, she denies CP, SOB, N/V/D.          OBJECTIVE  PAST MEDICAL & SURGICAL HISTORY  Breast cancer      ALLERGIES:  penicillin (Anaphylaxis)    MEDICATIONS:  STANDING MEDICATIONS  bisacodyl 5 milliGRAM(s) Oral at bedtime  bisacodyl Suppository 10 milliGRAM(s) Rectal once  chlorhexidine 4% Liquid 1 Application(s) Topical <User Schedule>  dexAMETHasone     Tablet 4 milliGRAM(s) Oral every 12 hours  enoxaparin Injectable 40 milliGRAM(s) SubCutaneous daily  gabapentin 100 milliGRAM(s) Oral at bedtime  methocarbamol 500 milliGRAM(s) Oral three times a day  pantoprazole    Tablet 40 milliGRAM(s) Oral before breakfast  senna 2 Tablet(s) Oral at bedtime  tamoxifen 20 milliGRAM(s) Oral at bedtime    PRN MEDICATIONS  acetaminophen   Tablet .. 650 milliGRAM(s) Oral every 6 hours PRN  ondansetron Injectable 4 milliGRAM(s) IV Push every 6 hours PRN  senna 2 Tablet(s) Oral once PRN  traMADol 50 milliGRAM(s) Oral every 8 hours PRN      VITAL SIGNS: Last 24 Hours  T(C): 36.6 (18 Sep 2020 05:00), Max: 37.4 (17 Sep 2020 23:45)  T(F): 97.8 (18 Sep 2020 05:00), Max: 99.3 (17 Sep 2020 23:45)  HR: 77 (18 Sep 2020 05:00) (77 - 86)  BP: 117/58 (18 Sep 2020 05:00) (97/52 - 135/62)  BP(mean): --  RR: 18 (18 Sep 2020 05:00) (18 - 18)  SpO2: 97% (17 Sep 2020 23:45) (97% - 97%)    LABS:                        9.3    13.48 )-----------( 172      ( 18 Sep 2020 06:08 )             27.7     09-18    139  |  105  |  10  ----------------------------<  134<H>  4.8   |  25  |  0.9    Ca    9.0      18 Sep 2020 06:08    TPro  5.8<L>  /  Alb  3.6  /  TBili  0.7  /  DBili  x   /  AST  18  /  ALT  12  /  AlkPhos  47  09-18          Creatine Kinase, Serum: 72 U/L (09-17-20 @ 11:47)      CARDIAC MARKERS ( 17 Sep 2020 11:47 )  x     / x     / 72 U/L / x     / x          RADIOLOGY:  < from: MR Lumbar Spine w/ IV Cont (09.16.20 @ 22:07) >  IMPRESSION:    Multilevel mild to moderate lumbar spondylosis, most significant at L3-L4 with moderate spinal canal stenosis and mild bilateral foraminal narrowing.    No abnormal enhancement.    < end of copied text >  < from: CT Cervical Spine No Cont (09.15.20 @ 22:20) >  IMPRESSION:      No evidence of a cervical spine fracture or subluxation.    Straightening of the cervical lordosis secondary to positioning or muscle spasm.    < end of copied text >    PHYSICAL EXAM:    GENERAL: NAD, well-developed, AAOx3  HEENT:  Atraumatic, Normocephalic. EOMI, PERRLA, conjunctiva and sclera clear, No JVD  PULMONARY: Clear to auscultation bilaterally; No wheeze  CARDIOVASCULAR: Regular rate and rhythm; No murmurs, rubs, or gallops  GASTROINTESTINAL: Soft, Nontender, Nondistended; Bowel sounds present  MUSCULOSKELETAL:  2+ Peripheral Pulses, No clubbing, cyanosis, or edema  NEUROLOGY:(+) straight leg raise on Left. Decreased sensation over left anterior thigh, lateral calf, and dorsum of right foot   RLE sensation decreased but at baseline for pt   SKIN: No rashes or lesions

## 2020-09-18 NOTE — DISCHARGE NOTE NURSING/CASE MANAGEMENT/SOCIAL WORK - NSTRANSFERBELONGINGSDISPO_GEN_A_NUR
with patient

## 2020-09-18 NOTE — DISCHARGE NOTE NURSING/CASE MANAGEMENT/SOCIAL WORK - PATIENT PORTAL LINK FT
You can access the FollowMyHealth Patient Portal offered by [facility] by registering at the following website: [email or web address]. By joining [employer]’s FollowMyHealth portal, you will also be able to view your health information using other applications (apps) compatible with our system.

## 2020-09-19 LAB
ALBUMIN SERPL ELPH-MCNC: 3.4 G/DL — LOW (ref 3.5–5.2)
ALP SERPL-CCNC: 38 U/L — SIGNIFICANT CHANGE UP (ref 30–115)
ALT FLD-CCNC: 11 U/L — SIGNIFICANT CHANGE UP (ref 0–41)
ANION GAP SERPL CALC-SCNC: 9 MMOL/L — SIGNIFICANT CHANGE UP (ref 7–14)
AST SERPL-CCNC: 14 U/L — SIGNIFICANT CHANGE UP (ref 0–41)
BASOPHILS # BLD AUTO: 0.02 K/UL — SIGNIFICANT CHANGE UP (ref 0–0.2)
BASOPHILS NFR BLD AUTO: 0.1 % — SIGNIFICANT CHANGE UP (ref 0–1)
BILIRUB SERPL-MCNC: 0.4 MG/DL — SIGNIFICANT CHANGE UP (ref 0.2–1.2)
BUN SERPL-MCNC: 19 MG/DL — SIGNIFICANT CHANGE UP (ref 10–20)
CALCIUM SERPL-MCNC: 8.9 MG/DL — SIGNIFICANT CHANGE UP (ref 8.5–10.1)
CHLORIDE SERPL-SCNC: 106 MMOL/L — SIGNIFICANT CHANGE UP (ref 98–110)
CO2 SERPL-SCNC: 25 MMOL/L — SIGNIFICANT CHANGE UP (ref 17–32)
CREAT SERPL-MCNC: 1 MG/DL — SIGNIFICANT CHANGE UP (ref 0.7–1.5)
EOSINOPHIL # BLD AUTO: 0 K/UL — SIGNIFICANT CHANGE UP (ref 0–0.7)
EOSINOPHIL NFR BLD AUTO: 0 % — SIGNIFICANT CHANGE UP (ref 0–8)
GLUCOSE SERPL-MCNC: 122 MG/DL — HIGH (ref 70–99)
HCT VFR BLD CALC: 25 % — LOW (ref 37–47)
HGB BLD-MCNC: 8.3 G/DL — LOW (ref 12–16)
IMM GRANULOCYTES NFR BLD AUTO: 1.1 % — HIGH (ref 0.1–0.3)
LYMPHOCYTES # BLD AUTO: 1.58 K/UL — SIGNIFICANT CHANGE UP (ref 1.2–3.4)
LYMPHOCYTES # BLD AUTO: 9.1 % — LOW (ref 20.5–51.1)
MCHC RBC-ENTMCNC: 31 PG — SIGNIFICANT CHANGE UP (ref 27–31)
MCHC RBC-ENTMCNC: 33.2 G/DL — SIGNIFICANT CHANGE UP (ref 32–37)
MCV RBC AUTO: 93.3 FL — SIGNIFICANT CHANGE UP (ref 81–99)
MONOCYTES # BLD AUTO: 1.16 K/UL — HIGH (ref 0.1–0.6)
MONOCYTES NFR BLD AUTO: 6.7 % — SIGNIFICANT CHANGE UP (ref 1.7–9.3)
NEUTROPHILS # BLD AUTO: 14.49 K/UL — HIGH (ref 1.4–6.5)
NEUTROPHILS NFR BLD AUTO: 83 % — HIGH (ref 42.2–75.2)
NRBC # BLD: 0 /100 WBCS — SIGNIFICANT CHANGE UP (ref 0–0)
PLATELET # BLD AUTO: 196 K/UL — SIGNIFICANT CHANGE UP (ref 130–400)
POTASSIUM SERPL-MCNC: 4.2 MMOL/L — SIGNIFICANT CHANGE UP (ref 3.5–5)
POTASSIUM SERPL-SCNC: 4.2 MMOL/L — SIGNIFICANT CHANGE UP (ref 3.5–5)
PROT SERPL-MCNC: 5.4 G/DL — LOW (ref 6–8)
RBC # BLD: 2.68 M/UL — LOW (ref 4.2–5.4)
RBC # FLD: 15 % — HIGH (ref 11.5–14.5)
SODIUM SERPL-SCNC: 140 MMOL/L — SIGNIFICANT CHANGE UP (ref 135–146)
WBC # BLD: 17.44 K/UL — HIGH (ref 4.8–10.8)
WBC # FLD AUTO: 17.44 K/UL — HIGH (ref 4.8–10.8)

## 2020-09-19 PROCEDURE — 99233 SBSQ HOSP IP/OBS HIGH 50: CPT

## 2020-09-19 RX ORDER — POLYETHYLENE GLYCOL 3350 17 G/17G
17 POWDER, FOR SOLUTION ORAL ONCE
Refills: 0 | Status: DISCONTINUED | OUTPATIENT
Start: 2020-09-19 | End: 2020-09-21

## 2020-09-19 RX ORDER — PREGABALIN 225 MG/1
1000 CAPSULE ORAL DAILY
Refills: 0 | Status: DISCONTINUED | OUTPATIENT
Start: 2020-09-19 | End: 2020-09-21

## 2020-09-19 RX ORDER — DEXAMETHASONE 0.5 MG/5ML
4 ELIXIR ORAL DAILY
Refills: 0 | Status: COMPLETED | OUTPATIENT
Start: 2020-09-19 | End: 2020-09-20

## 2020-09-19 RX ADMIN — GABAPENTIN 100 MILLIGRAM(S): 400 CAPSULE ORAL at 17:09

## 2020-09-19 RX ADMIN — ENOXAPARIN SODIUM 40 MILLIGRAM(S): 100 INJECTION SUBCUTANEOUS at 11:20

## 2020-09-19 RX ADMIN — CHLORHEXIDINE GLUCONATE 1 APPLICATION(S): 213 SOLUTION TOPICAL at 05:25

## 2020-09-19 RX ADMIN — TAMOXIFEN CITRATE 20 MILLIGRAM(S): 20 TABLET, FILM COATED ORAL at 22:46

## 2020-09-19 RX ADMIN — Medication 4 MILLIGRAM(S): at 05:24

## 2020-09-19 RX ADMIN — PREGABALIN 1000 MICROGRAM(S): 225 CAPSULE ORAL at 15:23

## 2020-09-19 RX ADMIN — PANTOPRAZOLE SODIUM 40 MILLIGRAM(S): 20 TABLET, DELAYED RELEASE ORAL at 05:24

## 2020-09-19 RX ADMIN — Medication 1 TABLET(S): at 11:19

## 2020-09-19 RX ADMIN — GABAPENTIN 100 MILLIGRAM(S): 400 CAPSULE ORAL at 05:24

## 2020-09-19 NOTE — PROGRESS NOTE ADULT - SUBJECTIVE AND OBJECTIVE BOX
TONI MORRIS  [facility] T2-3A 018 B ([facility] T2-3A)            Patient was evaluated and examined  by bedside, feeling better today, decreased left leg pain, able to move around ,still has difficulty ambulating due to pain                REVIEW OF SYSTEMS:  please see pertinent positives mentioned above, all other 12 ROS negative      T(C): , Max: 37.4 (09-18-20 @ 21:08)  HR: 77 (09-19-20 @ 06:13)  BP: 125/58 (09-19-20 @ 06:13)  RR: 18 (09-19-20 @ 06:13)  SpO2: 96% (09-19-20 @ 06:13)  CAPILLARY BLOOD GLUCOSE          PHYSICAL EXAM:  General: NAD, AAOX3, patient is laying comfortably in bed, anxious  HEENT: AT, NC, Supple, NO JVD, NO CB  Lungs: CTA B/L, no wheezing, no rhonchi  CVS: normal S1, S2, RRR, NO M/G/R  Abdomen: soft, bowel sounds present, non-tender, non-distended  Extremities: no edema, no clubbing, no cyanosis, positive peripheral pulses b/l  Neuro: no acute focal neurological deficits, left leg pain  Skin: left hip bruise present      LAB  CBC  Date: 09-19-20 @ 07:00  Mean cell Ufluwptamu70.0  Mean cell Hemoglobin Conc33.2  Mean cell Volum 93.3  Platelet count-Automate 196  RBC Count 2.68  Red Cell Distrib Width15.0  WBC Count17.44  % Albumin, Urine--  Hematocrit 25.0  Hemoglobin 8.3  CBC  Date: 09-18-20 @ 06:08  Mean cell Xkzlxqlaxx34.6  Mean cell Hemoglobin Conc33.6  Mean cell Volum 91.1  Platelet count-Automate 172  RBC Count 3.04  Red Cell Distrib Width15.1  WBC Count13.48  % Albumin, Urine--  Hematocrit 27.7  Hemoglobin 9.3  CBC  Date: 09-17-20 @ 20:00  Mean cell Kzqpdjouwy27.5  Mean cell Hemoglobin Conc33.5  Mean cell Volum 91.1  Platelet count-Automate 147  RBC Count 3.02  Red Cell Distrib Width14.4  WBC Count11.29  % Albumin, Urine--  Hematocrit 27.5  Hemoglobin 9.2  CBC  Date: 09-17-20 @ 11:47  Mean cell Tinvgvcmnz47.3  Mean cell Hemoglobin Conc33.0  Mean cell Volum 94.8  Platelet count-Automate 183  RBC Count 2.33  Red Cell Distrib Width13.3  WBC Count12.35  % Albumin, Urine--  Hematocrit 22.1  Hemoglobin 7.3  CBC  Date: 09-17-20 @ 10:50  Mean cell Rohgbujsng18.1  Mean cell Hemoglobin Conc33.2  Mean cell Volum 93.9  Platelet count-Automate 161  RBC Count 2.28  Red Cell Distrib Width13.3  WBC Count12.11  % Albumin, Urine--  Hematocrit 21.4  Hemoglobin 7.1  CBC  Date: 09-17-20 @ 06:10  Mean cell Wypumpraqq89.6  Mean cell Hemoglobin Conc33.0  Mean cell Volum 95.8  Platelet count-Automate 150  RBC Count 2.15  Red Cell Distrib Width13.3  WBC Count10.60  % Albumin, Urine--  Hematocrit 20.6  Hemoglobin 6.8  CBC  Date: 09-15-20 @ 21:40  Mean cell Scpvebtnfx35.5  Mean cell Hemoglobin Conc32.9  Mean cell Volum 95.8  Platelet count-Automate 208  RBC Count 3.11  Red Cell Distrib Width13.2  WBC Count11.96  % Albumin, Urine--  Hematocrit 29.8  Hemoglobin 9.8    [facility]  09-19-20 @ 07:00  Blood Gas Arterial-Calcium,Ionized--  Blood Urea Nitrogen, Serum 19 mg/dL [10 - 20]  Carbon Dioxide, Serum25 mmol/L [17 - 32]  Chloride, Fmqds001 mmol/L [98 - 110]  Creatinie, Serum1.0 mg/dL [0.7 - 1.5]  Glucose, Awtty832 mg/dL<H> [70 - 99]  Potassium, Serum4.2 mmol/L [3.5 - 5.0]  Sodium, Serum 140 mmol/L [135 - 146]  [facility]  09-18-20 @ 06:08  Blood Gas Arterial-Calcium,Ionized--  Blood Urea Nitrogen, Serum 10 mg/dL [10 - 20]  Carbon Dioxide, Serum25 mmol/L [17 - 32]  Chloride, Yoixe812 mmol/L [98 - 110]  Creatinie, Serum0.9 mg/dL [0.7 - 1.5]  Glucose, Xfyfy459 mg/dL<H> [70 - 99]  Potassium, Serum4.8 mmol/L [3.5 - 5.0]  Sodium, Serum 139 mmol/L [135 - 146]  [facility]  09-17-20 @ 06:10  Blood Gas Arterial-Calcium,Ionized--  Blood Urea Nitrogen, Serum 9 mg/dL<L> [10 - 20]  Carbon Dioxide, Serum24 mmol/L [17 - 32]  Chloride, Ygyon078 mmol/L [98 - 110]  Creatinie, Serum1.0 mg/dL [0.7 - 1.5]  Glucose, Ksxht070 mg/dL<H> [70 - 99]  Potassium, Serum4.1 mmol/L [3.5 - 5.0]  Sodium, Serum 137 mmol/L [135 - 146]  BMP  09-15-20 @ 21:40  Blood Gas Arterial-Calcium,Ionized--  Blood Urea Nitrogen, Serum 17 mg/dL [10 - 20]  Carbon Dioxide, Serum24 mmol/L [17 - 32]  Chloride, Xjfhl717 mmol/L [98 - 110]  Creatinie, Serum1.1 mg/dL [0.7 - 1.5]  Glucose, Lpcrm724 mg/dL<H> [70 - 99]  Potassium, Serum4.1 mmol/L [3.5 - 5.0]  Sodium, Serum 137 mmol/L [135 - 146]        Medications:  acetaminophen   Tablet .. 650 milliGRAM(s) Oral every 6 hours PRN  bisacodyl 5 milliGRAM(s) Oral at bedtime  bisacodyl Suppository 10 milliGRAM(s) Rectal once  chlorhexidine 4% Liquid 1 Application(s) Topical <User Schedule>  cyanocobalamin 1000 MICROGram(s) Oral daily  dexAMETHasone     Tablet 4 milliGRAM(s) Oral daily  enoxaparin Injectable 40 milliGRAM(s) SubCutaneous daily  gabapentin 100 milliGRAM(s) Oral two times a day  multivitamin 1 Tablet(s) Oral daily  ondansetron Injectable 4 milliGRAM(s) IV Push every 6 hours PRN  pantoprazole    Tablet 40 milliGRAM(s) Oral before breakfast  senna 2 Tablet(s) Oral once PRN  senna 2 Tablet(s) Oral at bedtime  tamoxifen 20 milliGRAM(s) Oral at bedtime  traMADol 50 milliGRAM(s) Oral every 8 hours PRN        Assessment and Plan:  # s/p mechanical fall at home with inability to ambulate, left sided sciatica  -patient is improving clinically with decreased left leg pain  - pelvic and hips x ray - no acute fractures  - s/p MR L-Spine:  from: MR Lumbar Spine w/ IV Cont (09.16.20 @ 22:07) >    Multilevel mild to moderate lumbar spondylosis, most significant at L3-L4 with moderate spinal canal stenosis and mild bilateral foraminal narrowing.    No abnormal enhancement.    - started on Neurontin 100 mg po once twice daily  -taper down  Decadron trial tx. 4mg po daily x 1 more day  -Tylenol/tramadol prn for pain management, pt. is not tolerating percocet( vomiting post intake)  -Robaxin tx.  -PT tx. daily as tolerated  -Neurosurgical eval- no interventions    # Macrocytic anemia-   - no gross bleeding events  - f/up stool hemeoccult study  -s/p 1 unit PRBC, h/h stable  -daily MVI tx., daily vitamin b 12 tx.    # h/o breast ca- on tamoxifen tx.    # Constipated- on laxative tx.    daily DVT proph.     #Progress Note Handoff: PT tx. daily as tolerated, based on ambulatory status will start d/c planning  Family discussion: Medical plan of tx. was with pt. by bedside Disposition: Possibly Home___ in 24 to 48 hours .

## 2020-09-20 LAB
ALBUMIN SERPL ELPH-MCNC: 3.1 G/DL — LOW (ref 3.5–5.2)
ALP SERPL-CCNC: 36 U/L — SIGNIFICANT CHANGE UP (ref 30–115)
ALT FLD-CCNC: 12 U/L — SIGNIFICANT CHANGE UP (ref 0–41)
ANION GAP SERPL CALC-SCNC: 8 MMOL/L — SIGNIFICANT CHANGE UP (ref 7–14)
AST SERPL-CCNC: 16 U/L — SIGNIFICANT CHANGE UP (ref 0–41)
BASOPHILS # BLD AUTO: 0.01 K/UL — SIGNIFICANT CHANGE UP (ref 0–0.2)
BASOPHILS NFR BLD AUTO: 0.1 % — SIGNIFICANT CHANGE UP (ref 0–1)
BILIRUB SERPL-MCNC: 0.7 MG/DL — SIGNIFICANT CHANGE UP (ref 0.2–1.2)
BUN SERPL-MCNC: 22 MG/DL — HIGH (ref 10–20)
CALCIUM SERPL-MCNC: 8.6 MG/DL — SIGNIFICANT CHANGE UP (ref 8.5–10.1)
CHLORIDE SERPL-SCNC: 108 MMOL/L — SIGNIFICANT CHANGE UP (ref 98–110)
CO2 SERPL-SCNC: 25 MMOL/L — SIGNIFICANT CHANGE UP (ref 17–32)
CREAT SERPL-MCNC: 1 MG/DL — SIGNIFICANT CHANGE UP (ref 0.7–1.5)
EOSINOPHIL # BLD AUTO: 0.02 K/UL — SIGNIFICANT CHANGE UP (ref 0–0.7)
EOSINOPHIL NFR BLD AUTO: 0.1 % — SIGNIFICANT CHANGE UP (ref 0–8)
GLUCOSE BLDC GLUCOMTR-MCNC: 108 MG/DL — HIGH (ref 70–99)
GLUCOSE BLDC GLUCOMTR-MCNC: 125 MG/DL — HIGH (ref 70–99)
GLUCOSE SERPL-MCNC: 96 MG/DL — SIGNIFICANT CHANGE UP (ref 70–99)
HCT VFR BLD CALC: 23.4 % — LOW (ref 37–47)
HGB BLD-MCNC: 7.7 G/DL — LOW (ref 12–16)
IMM GRANULOCYTES NFR BLD AUTO: 0.9 % — HIGH (ref 0.1–0.3)
LYMPHOCYTES # BLD AUTO: 14.4 % — LOW (ref 20.5–51.1)
LYMPHOCYTES # BLD AUTO: 2.05 K/UL — SIGNIFICANT CHANGE UP (ref 1.2–3.4)
MCHC RBC-ENTMCNC: 30.8 PG — SIGNIFICANT CHANGE UP (ref 27–31)
MCHC RBC-ENTMCNC: 32.9 G/DL — SIGNIFICANT CHANGE UP (ref 32–37)
MCV RBC AUTO: 93.6 FL — SIGNIFICANT CHANGE UP (ref 81–99)
MONOCYTES # BLD AUTO: 1.31 K/UL — HIGH (ref 0.1–0.6)
MONOCYTES NFR BLD AUTO: 9.2 % — SIGNIFICANT CHANGE UP (ref 1.7–9.3)
NEUTROPHILS # BLD AUTO: 10.7 K/UL — HIGH (ref 1.4–6.5)
NEUTROPHILS NFR BLD AUTO: 75.3 % — HIGH (ref 42.2–75.2)
NRBC # BLD: 0 /100 WBCS — SIGNIFICANT CHANGE UP (ref 0–0)
PLATELET # BLD AUTO: 202 K/UL — SIGNIFICANT CHANGE UP (ref 130–400)
POTASSIUM SERPL-MCNC: 4 MMOL/L — SIGNIFICANT CHANGE UP (ref 3.5–5)
POTASSIUM SERPL-SCNC: 4 MMOL/L — SIGNIFICANT CHANGE UP (ref 3.5–5)
PROT SERPL-MCNC: 4.8 G/DL — LOW (ref 6–8)
RBC # BLD: 2.5 M/UL — LOW (ref 4.2–5.4)
RBC # FLD: 15 % — HIGH (ref 11.5–14.5)
SODIUM SERPL-SCNC: 141 MMOL/L — SIGNIFICANT CHANGE UP (ref 135–146)
WBC # BLD: 14.22 K/UL — HIGH (ref 4.8–10.8)
WBC # FLD AUTO: 14.22 K/UL — HIGH (ref 4.8–10.8)

## 2020-09-20 PROCEDURE — 99233 SBSQ HOSP IP/OBS HIGH 50: CPT

## 2020-09-20 RX ADMIN — PREGABALIN 1000 MICROGRAM(S): 225 CAPSULE ORAL at 11:23

## 2020-09-20 RX ADMIN — Medication 4 MILLIGRAM(S): at 05:46

## 2020-09-20 RX ADMIN — TAMOXIFEN CITRATE 20 MILLIGRAM(S): 20 TABLET, FILM COATED ORAL at 22:40

## 2020-09-20 RX ADMIN — Medication 5 MILLIGRAM(S): at 22:40

## 2020-09-20 RX ADMIN — GABAPENTIN 100 MILLIGRAM(S): 400 CAPSULE ORAL at 05:46

## 2020-09-20 RX ADMIN — CHLORHEXIDINE GLUCONATE 1 APPLICATION(S): 213 SOLUTION TOPICAL at 05:46

## 2020-09-20 RX ADMIN — Medication 1 TABLET(S): at 11:23

## 2020-09-20 RX ADMIN — ENOXAPARIN SODIUM 40 MILLIGRAM(S): 100 INJECTION SUBCUTANEOUS at 11:23

## 2020-09-20 RX ADMIN — GABAPENTIN 100 MILLIGRAM(S): 400 CAPSULE ORAL at 17:03

## 2020-09-20 RX ADMIN — SENNA PLUS 2 TABLET(S): 8.6 TABLET ORAL at 22:40

## 2020-09-20 RX ADMIN — PANTOPRAZOLE SODIUM 40 MILLIGRAM(S): 20 TABLET, DELAYED RELEASE ORAL at 07:49

## 2020-09-20 NOTE — PROGRESS NOTE ADULT - ATTENDING COMMENTS
I saw and evaluated patient  by bedside, feels better today, decreased left leg pain, improved ambulatory strength, patient was sitting in a chair, smiling today , tolerating diet well.  had BM  All labs, radiology studies, VS was reviewed  I have reviewed the resident's note and agree with documented findings and  plan of care.  a/p:  # s/p mechanical fall at home with inability to ambulate, left sided sciatica  -patient is improving clinically with decreased left leg pain  - pelvic and hips x ray - no acute fractures  - s/p MR L-Spine:  from: MR Lumbar Spine w/ IV Cont (09.16.20 @ 22:07) >    Multilevel mild to moderate lumbar spondylosis, most significant at L3-L4 with moderate spinal canal stenosis and mild bilateral foraminal narrowing.    No abnormal enhancement.    - started on Neurontin 100 mg po once twice daily  -taper down and d/c  Decadron tx.  -Tylenol/tramadol prn for pain management, pt. is not tolerating percocet( vomiting post intake)  -Robaxin tx.  -PT tx. daily as tolerated  -Neurosurgical eval- no interventions    # Macrocytic anemia-   - no gross bleeding events  - f/up stool hemeoccult study  -s/p 1 unit PRBC, h/h slightly dropped, continue to monitor  -daily MVI tx., daily vitamin b 12 tx.    # h/o breast ca- on tamoxifen tx.    # Constipated- on laxative tx.    daily DVT proph.     #Progress Note Handoff: PT tx. daily as tolerated, based on ambulatory status will start d/c planning, slight h/h drop today, f/up cbc in am , transfuse if drops 7 or below  Family discussion: Medical plan of tx. was with pt. by bedside Disposition: Possibly Home___ in 24  hours .

## 2020-09-20 NOTE — PROGRESS NOTE ADULT - SUBJECTIVE AND OBJECTIVE BOX
TONI MORRIS 79y Female  MRN#: [id number]   CODE STATUS:________      SUBJECTIVE  Patient is a 79y old Female who presents with a chief complaint of Mechanical Fall (19 Sep 2020 12:00)  Currently admitted to medicine with the primary diagnosis of Ambulatory dysfunction      Today is hospital day 4d.   No acute events overnight. Patient was seen this morning walking with PT and was performing well. She continues to endorse back and LLE pain but much reduced in nature. She feels much stronger.           OBJECTIVE  PAST MEDICAL & SURGICAL HISTORY  Breast cancer      ALLERGIES:  penicillin (Anaphylaxis)    MEDICATIONS:  STANDING MEDICATIONS  bisacodyl 5 milliGRAM(s) Oral at bedtime  bisacodyl Suppository 10 milliGRAM(s) Rectal once  chlorhexidine 4% Liquid 1 Application(s) Topical <User Schedule>  cyanocobalamin 1000 MICROGram(s) Oral daily  enoxaparin Injectable 40 milliGRAM(s) SubCutaneous daily  gabapentin 100 milliGRAM(s) Oral two times a day  multivitamin 1 Tablet(s) Oral daily  pantoprazole    Tablet 40 milliGRAM(s) Oral before breakfast  polyethylene glycol 3350 17 Gram(s) Oral once  senna 2 Tablet(s) Oral at bedtime  tamoxifen 20 milliGRAM(s) Oral at bedtime    PRN MEDICATIONS  acetaminophen   Tablet .. 650 milliGRAM(s) Oral every 6 hours PRN  ondansetron Injectable 4 milliGRAM(s) IV Push every 6 hours PRN  senna 2 Tablet(s) Oral once PRN  traMADol 50 milliGRAM(s) Oral every 8 hours PRN      VITAL SIGNS: Last 24 Hours  T(C): 36.1 (20 Sep 2020 06:17), Max: 36.2 (19 Sep 2020 21:42)  T(F): 96.9 (20 Sep 2020 06:17), Max: 97.2 (19 Sep 2020 21:42)  HR: 81 (20 Sep 2020 06:17) (78 - 101)  BP: 127/69 (20 Sep 2020 06:17) (125/56 - 129/59)  BP(mean): --  RR: 17 (20 Sep 2020 06:17) (17 - 18)  SpO2: 96% (20 Sep 2020 08:00) (96% - 96%)    LABS:                        7.7    14.22 )-----------( 202      ( 20 Sep 2020 07:18 )             23.4     09-20    141  |  108  |  22<H>  ----------------------------<  96  4.0   |  25  |  1.0    Ca    8.6      20 Sep 2020 07:18    TPro  4.8<L>  /  Alb  3.1<L>  /  TBili  0.7  /  DBili  x   /  AST  16  /  ALT  12  /  AlkPhos  36  09-20                  RADIOLOGY:      PHYSICAL EXAM:    GENERAL: NAD, well-developed, AAOx3  HEENT:  Atraumatic, Normocephalic. EOMI, PERRLA, conjunctiva and sclera clear, No JVD  PULMONARY: Clear to auscultation bilaterally; No wheeze  CARDIOVASCULAR: Regular rate and rhythm; No murmurs, rubs, or gallops  GASTROINTESTINAL: Soft, Nontender, Nondistended; Bowel sounds present  MUSCULOSKELETAL:  2+ Peripheral Pulses, No clubbing, cyanosis, or edema  NEUROLOGY:(+) straight leg raise on Left. Decreased sensation over left anterior thigh, lateral calf, and dorsum of right foot   RLE sensation decreased but at baseline for pt   SKIN: No rashes or lesions

## 2020-09-20 NOTE — PROGRESS NOTE ADULT - ASSESSMENT
# s/p mechanical fall at home with inability to ambulate  - pelvic and hips x ray - no acute fractures  - s/p MR L-Spine:  from: MR Lumbar Spine w/ IV Cont (09.16.20 @ 22:07) >    Multilevel mild to moderate lumbar spondylosis, most significant at L3-L4 with moderate spinal canal stenosis and mild bilateral foraminal narrowing.    No abnormal enhancement.  - started on Neurontin 100 mg po once twice daily  - adjust Decadron to 4mg po daily - to help decrease local inflammatory process post fall   - Tylenol/tramadol prn for pain management, pt. is not tolerating percocet( vomiting post intake)  - continue Robaxin tx.  - PT eval to assess ambulation  - Neurosurgical eval- no interventions    # Macrocytic anemia- obtain anemia work up  - no gross bleeding events  - f/up stool hemoccult study  - s/p 1 unit PRBC, h/h stable  - daily MVI tx., daily vitamin b 12 tx.      # h/o breast ca- on tamoxifen tx.    # Constipated- on laxative tx.    daily DVT prophylaxis    #Progress Note Handoff: D/C planning    Family discussion: medical plan of tx. was d/w pt. by bedside   Disposition: Home__once medically stable

## 2020-09-21 ENCOUNTER — TRANSCRIPTION ENCOUNTER (OUTPATIENT)
Age: 80
End: 2020-09-21

## 2020-09-21 VITALS — DIASTOLIC BLOOD PRESSURE: 56 MMHG | HEART RATE: 76 BPM | SYSTOLIC BLOOD PRESSURE: 111 MMHG

## 2020-09-21 LAB
ANION GAP SERPL CALC-SCNC: 11 MMOL/L — SIGNIFICANT CHANGE UP (ref 7–14)
BASOPHILS # BLD AUTO: 0.03 K/UL — SIGNIFICANT CHANGE UP (ref 0–0.2)
BASOPHILS NFR BLD AUTO: 0.2 % — SIGNIFICANT CHANGE UP (ref 0–1)
BLD GP AB SCN SERPL QL: SIGNIFICANT CHANGE UP
BUN SERPL-MCNC: 20 MG/DL — SIGNIFICANT CHANGE UP (ref 10–20)
CALCIUM SERPL-MCNC: 9 MG/DL — SIGNIFICANT CHANGE UP (ref 8.5–10.1)
CHLORIDE SERPL-SCNC: 104 MMOL/L — SIGNIFICANT CHANGE UP (ref 98–110)
CO2 SERPL-SCNC: 23 MMOL/L — SIGNIFICANT CHANGE UP (ref 17–32)
CREAT SERPL-MCNC: 1.1 MG/DL — SIGNIFICANT CHANGE UP (ref 0.7–1.5)
EOSINOPHIL # BLD AUTO: 0.11 K/UL — SIGNIFICANT CHANGE UP (ref 0–0.7)
EOSINOPHIL NFR BLD AUTO: 0.7 % — SIGNIFICANT CHANGE UP (ref 0–8)
GLUCOSE SERPL-MCNC: 85 MG/DL — SIGNIFICANT CHANGE UP (ref 70–99)
HCT VFR BLD CALC: 26.4 % — LOW (ref 37–47)
HGB BLD-MCNC: 8.5 G/DL — LOW (ref 12–16)
IMM GRANULOCYTES NFR BLD AUTO: 1.6 % — HIGH (ref 0.1–0.3)
LYMPHOCYTES # BLD AUTO: 25.8 % — SIGNIFICANT CHANGE UP (ref 20.5–51.1)
LYMPHOCYTES # BLD AUTO: 3.99 K/UL — HIGH (ref 1.2–3.4)
MAGNESIUM SERPL-MCNC: 1.7 MG/DL — LOW (ref 1.8–2.4)
MCHC RBC-ENTMCNC: 30.6 PG — SIGNIFICANT CHANGE UP (ref 27–31)
MCHC RBC-ENTMCNC: 32.2 G/DL — SIGNIFICANT CHANGE UP (ref 32–37)
MCV RBC AUTO: 95 FL — SIGNIFICANT CHANGE UP (ref 81–99)
MONOCYTES # BLD AUTO: 1.85 K/UL — HIGH (ref 0.1–0.6)
MONOCYTES NFR BLD AUTO: 12 % — HIGH (ref 1.7–9.3)
NEUTROPHILS # BLD AUTO: 9.24 K/UL — HIGH (ref 1.4–6.5)
NEUTROPHILS NFR BLD AUTO: 59.7 % — SIGNIFICANT CHANGE UP (ref 42.2–75.2)
NRBC # BLD: 0 /100 WBCS — SIGNIFICANT CHANGE UP (ref 0–0)
PLATELET # BLD AUTO: 264 K/UL — SIGNIFICANT CHANGE UP (ref 130–400)
POTASSIUM SERPL-MCNC: 3.4 MMOL/L — LOW (ref 3.5–5)
POTASSIUM SERPL-SCNC: 3.4 MMOL/L — LOW (ref 3.5–5)
RBC # BLD: 2.78 M/UL — LOW (ref 4.2–5.4)
RBC # FLD: 15.5 % — HIGH (ref 11.5–14.5)
SODIUM SERPL-SCNC: 138 MMOL/L — SIGNIFICANT CHANGE UP (ref 135–146)
WBC # BLD: 15.47 K/UL — HIGH (ref 4.8–10.8)
WBC # FLD AUTO: 15.47 K/UL — HIGH (ref 4.8–10.8)

## 2020-09-21 PROCEDURE — 99239 HOSP IP/OBS DSCHRG MGMT >30: CPT

## 2020-09-21 RX ORDER — GABAPENTIN 400 MG/1
1 CAPSULE ORAL
Qty: 60 | Refills: 0
Start: 2020-09-21 | End: 2020-10-20

## 2020-09-21 RX ORDER — PREGABALIN 225 MG/1
1 CAPSULE ORAL
Qty: 30 | Refills: 0
Start: 2020-09-21 | End: 2020-10-20

## 2020-09-21 RX ORDER — POTASSIUM PHOSPHATE, MONOBASIC POTASSIUM PHOSPHATE, DIBASIC 236; 224 MG/ML; MG/ML
30 INJECTION, SOLUTION INTRAVENOUS ONCE
Refills: 0 | Status: COMPLETED | OUTPATIENT
Start: 2020-09-21 | End: 2020-09-21

## 2020-09-21 RX ORDER — POLYETHYLENE GLYCOL 3350 17 G/17G
17 POWDER, FOR SOLUTION ORAL
Qty: 510 | Refills: 0
Start: 2020-09-21 | End: 2020-10-20

## 2020-09-21 RX ORDER — ACETAMINOPHEN 500 MG
2 TABLET ORAL
Qty: 240 | Refills: 0
Start: 2020-09-21 | End: 2020-10-20

## 2020-09-21 RX ADMIN — CHLORHEXIDINE GLUCONATE 1 APPLICATION(S): 213 SOLUTION TOPICAL at 05:43

## 2020-09-21 RX ADMIN — PREGABALIN 1000 MICROGRAM(S): 225 CAPSULE ORAL at 11:46

## 2020-09-21 RX ADMIN — PANTOPRAZOLE SODIUM 40 MILLIGRAM(S): 20 TABLET, DELAYED RELEASE ORAL at 05:43

## 2020-09-21 RX ADMIN — GABAPENTIN 100 MILLIGRAM(S): 400 CAPSULE ORAL at 17:29

## 2020-09-21 RX ADMIN — ENOXAPARIN SODIUM 40 MILLIGRAM(S): 100 INJECTION SUBCUTANEOUS at 11:46

## 2020-09-21 RX ADMIN — GABAPENTIN 100 MILLIGRAM(S): 400 CAPSULE ORAL at 05:43

## 2020-09-21 RX ADMIN — Medication 1 TABLET(S): at 11:46
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
## 2020-09-21 NOTE — DISCHARGE NOTE PROVIDER - NSDCCPCAREPLAN_GEN_ALL_CORE_FT
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 When you presented status post your fall, you did not have any fracture after the trauma work up but MRI lumbar spine noted mutilevel spinal stenosis which could be causing the left lower extremity pain you were feel.  We recommend continuing your pain medications as advised.  Also follow up with neurosurgery outpatient.  In addition, you can follow up with a physiatrist outpatient, a specialist in interventional spine/pain management.      SECONDARY DISCHARGE DIAGNOSES  Diagnosis: Anemia  Assessment and Plan of Treatment: We found your hemoglobin to be low and we gave you 1 unit of blood. Iron studies were within normal range.  We recommend that you follow up with Gastrointerologist outpatient for further workup and repeat colonoscopy.    Diagnosis: DJD (degenerative joint disease)  Assessment and Plan of Treatment: You have degenerative joint disease which could be the origin of most of your pain symptoms.   We recommend outpatient physical therapy.
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
## 2020-09-21 NOTE — DISCHARGE NOTE PROVIDER - NSDCMRMEDTOKEN_GEN_ALL_CORE_FT
acetaminophen 325 mg oral tablet: 2 tab(s) orally every 6 hours, As needed, Temp greater or equal to 38C (100.4F), Mild Pain (1 - 3)  cyanocobalamin 1000 mcg oral tablet: 1 tab(s) orally once a day  gabapentin 100 mg oral capsule: 1 cap(s) orally 2 times a day  Multiple Vitamins oral tablet: 1 tab(s) orally once a day  polyethylene glycol 3350 oral powder for reconstitution: 17 gram(s) orally once  TAMOXIFEN 20 MG TABLET: TAKE 1 TABLET BY MOUTH EVERY DAY

## 2020-09-21 NOTE — PROGRESS NOTE ADULT - SUBJECTIVE AND OBJECTIVE BOX
TONI MORRIS  [facility] T2-3A 018 B ([facility] T2-3A)            Patient was evaluated and examined  by bedside, feeling better with improved ambulation using walker.        REVIEW OF SYSTEMS:  please see pertinent positives mentioned above, all other 12 ROS negative      T(C): , Max: 36.9 (09-21-20 @ 05:08)  HR: 77 (09-21-20 @ 05:08)  BP: 115/56 (09-21-20 @ 05:08)  RR: 18 (09-21-20 @ 05:08)  SpO2: 98% (09-20-20 @ 19:40)  CAPILLARY BLOOD GLUCOSE          PHYSICAL EXAM:  General: NAD, AAOX3, patient is sitting comfortably in a chair  HEENT: AT, NC, Supple, NO JVD, NO CB  Lungs: CTA B/L, no wheezing, no rhonchi  CVS: normal S1, S2, RRR, NO M/G/R  Abdomen: soft, bowel sounds present, non-tender, non-distended  Extremities: no edema, no clubbing, no cyanosis, positive peripheral pulses b/l  Neuro: no acute focal neurological deficits  Skin: large ecchymosis/bruise left hip/left flank area      LAB  CBC  Date: 09-21-20 @ 11:44  Mean cell Kiioerowef54.6  Mean cell Hemoglobin Conc32.2  Mean cell Volum 95.0  Platelet count-Automate 264  RBC Count 2.78  Red Cell Distrib Width15.5  WBC Count15.47  % Albumin, Urine--  Hematocrit 26.4  Hemoglobin 8.5  CBC  Date: 09-20-20 @ 07:18  Mean cell Osyshgpcxl12.8  Mean cell Hemoglobin Conc32.9  Mean cell Volum 93.6  Platelet count-Automate 202  RBC Count 2.50  Red Cell Distrib Width15.0  WBC Count14.22  % Albumin, Urine--  Hematocrit 23.4  Hemoglobin 7.7  CBC  Date: 09-19-20 @ 07:00  Mean cell Kunvnaogvj63.0  Mean cell Hemoglobin Conc33.2  Mean cell Volum 93.3  Platelet count-Automate 196  RBC Count 2.68  Red Cell Distrib Width15.0  WBC Count17.44  % Albumin, Urine--  Hematocrit 25.0  Hemoglobin 8.3  CBC  Date: 09-18-20 @ 06:08  Mean cell Zzxvxxdlkc96.6  Mean cell Hemoglobin Conc33.6  Mean cell Volum 91.1  Platelet count-Automate 172  RBC Count 3.04  Red Cell Distrib Width15.1  WBC Count13.48  % Albumin, Urine--  Hematocrit 27.7  Hemoglobin 9.3  CBC  Date: 09-17-20 @ 20:00  Mean cell Isisjvczwf03.5  Mean cell Hemoglobin Conc33.5  Mean cell Volum 91.1  Platelet count-Automate 147  RBC Count 3.02  Red Cell Distrib Width14.4  WBC Count11.29  % Albumin, Urine--  Hematocrit 27.5  Hemoglobin 9.2  CBC  Date: 09-17-20 @ 11:47  Mean cell Jfwnqfwcqh01.3  Mean cell Hemoglobin Conc33.0  Mean cell Volum 94.8  Platelet count-Automate 183  RBC Count 2.33  Red Cell Distrib Width13.3  WBC Count12.35  % Albumin, Urine--  Hematocrit 22.1  Hemoglobin 7.3  CBC  Date: 09-17-20 @ 10:50  Mean cell Zxhqlbkccq74.1  Mean cell Hemoglobin Conc33.2  Mean cell Volum 93.9  Platelet count-Automate 161  RBC Count 2.28  Red Cell Distrib Width13.3  WBC Count12.11  % Albumin, Urine--  Hematocrit 21.4  Hemoglobin 7.1  CBC  Date: 09-17-20 @ 06:10  Mean cell Mkkwdxnlmq40.6  Mean cell Hemoglobin Conc33.0  Mean cell Volum 95.8  Platelet count-Automate 150  RBC Count 2.15  Red Cell Distrib Width13.3  WBC Count10.60  % Albumin, Urine--  Hematocrit 20.6  Hemoglobin 6.8  CBC  Date: 09-15-20 @ 21:40  Mean cell Syezeqhwwe49.5  Mean cell Hemoglobin Conc32.9  Mean cell Volum 95.8  Platelet count-Automate 208  RBC Count 3.11  Red Cell Distrib Width13.2  WBC Count11.96  % Albumin, Urine--  Hematocrit 29.8  Hemoglobin 9.8    [facility] [place]  09-21-20 @ 11:44  Blood Gas Arterial-Calcium,Ionized--  Blood Urea Nitrogen, Serum 20 mg/dL [10 - 20]  Carbon Dioxide, Serum23 mmol/L [17 - 32]  Chloride, Mfusb934 mmol/L [98 - 110]  Creatinie, Serum1.1 mg/dL [0.7 - 1.5]  Glucose, Serum85 mg/dL [70 - 99]  Potassium, Serum3.4 mmol/L<L> [3.5 - 5.0]  Sodium, Serum 138 mmol/L [135 - 146]  [facility] [place]  09-20-20 @ 07:18  Blood Gas Arterial-Calcium,Ionized--  Blood Urea Nitrogen, Serum 22 mg/dL<H> [10 - 20]  Carbon Dioxide, Serum25 mmol/L [17 - 32]  Chloride, Ogdva584 mmol/L [98 - 110]  Creatinie, Serum1.0 mg/dL [0.7 - 1.5]  Glucose, Serum96 mg/dL [70 - 99]  Potassium, Serum4.0 mmol/L [3.5 - 5.0]  Sodium, Serum 141 mmol/L [135 - 146]  [facility] [place]  09-19-20 @ 07:00  Blood Gas Arterial-Calcium,Ionized--  Blood Urea Nitrogen, Serum 19 mg/dL [10 - 20]  Carbon Dioxide, Serum25 mmol/L [17 - 32]  Chloride, Shnlv160 mmol/L [98 - 110]  Creatinie, Serum1.0 mg/dL [0.7 - 1.5]  Glucose, Qoouc137 mg/dL<H> [70 - 99]  Potassium, Serum4.2 mmol/L [3.5 - 5.0]  Sodium, Serum 140 mmol/L [135 - 146]  [facility] [place]  09-18-20 @ 06:08  Blood Gas Arterial-Calcium,Ionized--  Blood Urea Nitrogen, Serum 10 mg/dL [10 - 20]  Carbon Dioxide, Serum25 mmol/L [17 - 32]  Chloride, Vmmwi152 mmol/L [98 - 110]  Creatinie, Serum0.9 mg/dL [0.7 - 1.5]  Glucose, Fvwnu864 mg/dL<H> [70 - 99]  Potassium, Serum4.8 mmol/L [3.5 - 5.0]  Sodium, Serum 139 mmol/L [135 - 146]  [facility] [place]  09-17-20 @ 06:10  Blood Gas Arterial-Calcium,Ionized--  Blood Urea Nitrogen, Serum 9 mg/dL<L> [10 - 20]  Carbon Dioxide, Serum24 mmol/L [17 - 32]  Chloride, Ahwjn561 mmol/L [98 - 110]  Creatinie, Serum1.0 mg/dL [0.7 - 1.5]  Glucose, Rfmvf712 mg/dL<H> [70 - 99]  Potassium, Serum4.1 mmol/L [3.5 - 5.0]  Sodium, Serum 137 mmol/L [135 - 146]        Medications:  acetaminophen   Tablet .. 650 milliGRAM(s) Oral every 6 hours PRN  bisacodyl 5 milliGRAM(s) Oral at bedtime  bisacodyl Suppository 10 milliGRAM(s) Rectal once  chlorhexidine 4% Liquid 1 Application(s) Topical <User Schedule>  cyanocobalamin 1000 MICROGram(s) Oral daily  enoxaparin Injectable 40 milliGRAM(s) SubCutaneous daily  gabapentin 100 milliGRAM(s) Oral two times a day  multivitamin 1 Tablet(s) Oral daily  ondansetron Injectable 4 milliGRAM(s) IV Push every 6 hours PRN  pantoprazole    Tablet 40 milliGRAM(s) Oral before breakfast  polyethylene glycol 3350 17 Gram(s) Oral once  senna 2 Tablet(s) Oral once PRN  senna 2 Tablet(s) Oral at bedtime  tamoxifen 20 milliGRAM(s) Oral at bedtime  traMADol 50 milliGRAM(s) Oral every 8 hours PRN        Assessment and Plan:  # s/p mechanical fall at home with inability to ambulate  - pelvic and hips x ray - no acute fractures  - s/p MR L-Spine:  from: MR Lumbar Spine w/ IV Cont (09.16.20 @ 22:07) >    Multilevel mild to moderate lumbar spondylosis, most significant at L3-L4 with moderate spinal canal stenosis and mild bilateral foraminal narrowing.    No abnormal enhancement.  - will prescribe  Neurontin 100 mg po once twice daily  - patient was also tx. with  Decadron -tapered and d/c  - Tylenol/tramadol prn for pain management, pt. is not tolerating percocet( vomiting post intake)  - Home PT tx.  - Neurosurgical eval- no interventions  -patient to ambulate with walker at home with assistance, to continue maintaining falls precautions    # Macrocytic anemia- stable h/h  - no gross bleeding events  - patient received  1 unit PRBC during inpatient stay  - daily MVI tx., daily vitamin b 12 tx.    # hypokalemia, Hypomagnesemia -supplemented      # h/o breast ca- on tamoxifen tx.    # Constipated- on laxative tx.    daily DVT prophylaxis    #Progress Note Handoff: Patient was medically optimized, stable for d/c home with home PT and home care  Family discussion: medical plan of tx. d/w pt. by bedside Disposition: Home today

## 2020-09-24 DIAGNOSIS — E87.6 HYPOKALEMIA: ICD-10-CM

## 2020-09-24 DIAGNOSIS — M48.061 SPINAL STENOSIS, LUMBAR REGION WITHOUT NEUROGENIC CLAUDICATION: ICD-10-CM

## 2020-09-24 DIAGNOSIS — K59.00 CONSTIPATION, UNSPECIFIED: ICD-10-CM

## 2020-09-24 DIAGNOSIS — C50.919 MALIGNANT NEOPLASM OF UNSPECIFIED SITE OF UNSPECIFIED FEMALE BREAST: ICD-10-CM

## 2020-09-24 DIAGNOSIS — G89.29 OTHER CHRONIC PAIN: ICD-10-CM

## 2020-09-24 DIAGNOSIS — M79.604 PAIN IN RIGHT LEG: ICD-10-CM

## 2020-09-24 DIAGNOSIS — M54.32 SCIATICA, LEFT SIDE: ICD-10-CM

## 2020-09-24 DIAGNOSIS — Z88.0 ALLERGY STATUS TO PENICILLIN: ICD-10-CM

## 2020-09-24 DIAGNOSIS — Z79.890 HORMONE REPLACEMENT THERAPY: ICD-10-CM

## 2020-09-24 DIAGNOSIS — M47.9 SPONDYLOSIS, UNSPECIFIED: ICD-10-CM

## 2020-09-24 DIAGNOSIS — D64.9 ANEMIA, UNSPECIFIED: ICD-10-CM

## 2020-09-24 DIAGNOSIS — M47.26 OTHER SPONDYLOSIS WITH RADICULOPATHY, LUMBAR REGION: ICD-10-CM

## 2023-03-15 PROBLEM — C50.919 MALIGNANT NEOPLASM OF UNSPECIFIED SITE OF UNSPECIFIED FEMALE BREAST: Chronic | Status: ACTIVE | Noted: 2020-09-16

## 2023-03-21 ENCOUNTER — OUTPATIENT (OUTPATIENT)
Dept: OUTPATIENT SERVICES | Facility: HOSPITAL | Age: 83
LOS: 1 days | End: 2023-03-21
Payer: MEDICARE

## 2023-03-21 DIAGNOSIS — Z00.8 ENCOUNTER FOR OTHER GENERAL EXAMINATION: ICD-10-CM

## 2023-03-21 DIAGNOSIS — R51.9 HEADACHE, UNSPECIFIED: ICD-10-CM

## 2023-03-21 PROCEDURE — 70551 MRI BRAIN STEM W/O DYE: CPT

## 2023-03-21 PROCEDURE — 70551 MRI BRAIN STEM W/O DYE: CPT | Mod: 26

## 2023-03-22 DIAGNOSIS — R51.9 HEADACHE, UNSPECIFIED: ICD-10-CM

## 2023-04-20 ENCOUNTER — OUTPATIENT (OUTPATIENT)
Dept: OUTPATIENT SERVICES | Facility: HOSPITAL | Age: 83
LOS: 1 days | End: 2023-04-20
Payer: MEDICARE

## 2023-04-20 DIAGNOSIS — Z13.820 ENCOUNTER FOR SCREENING FOR OSTEOPOROSIS: ICD-10-CM

## 2023-04-20 DIAGNOSIS — Z87.310 PERSONAL HISTORY OF (HEALED) OSTEOPOROSIS FRACTURE: ICD-10-CM

## 2023-04-20 PROCEDURE — 77080 DXA BONE DENSITY AXIAL: CPT

## 2023-04-21 DIAGNOSIS — Z13.820 ENCOUNTER FOR SCREENING FOR OSTEOPOROSIS: ICD-10-CM
